# Patient Record
Sex: FEMALE | Race: WHITE | NOT HISPANIC OR LATINO | Employment: FULL TIME | URBAN - METROPOLITAN AREA
[De-identification: names, ages, dates, MRNs, and addresses within clinical notes are randomized per-mention and may not be internally consistent; named-entity substitution may affect disease eponyms.]

---

## 2017-10-10 ENCOUNTER — LAB (OUTPATIENT)
Dept: LAB | Facility: HOSPITAL | Age: 37
End: 2017-10-10

## 2017-10-10 ENCOUNTER — TRANSCRIBE ORDERS (OUTPATIENT)
Dept: LAB | Facility: HOSPITAL | Age: 37
End: 2017-10-10

## 2017-10-10 DIAGNOSIS — Z77.21 PERSONAL HISTORY OF EXPOSURE TO POTENTIALLY HAZARDOUS BODY FLUIDS: ICD-10-CM

## 2017-10-10 DIAGNOSIS — Z77.21 PERSONAL HISTORY OF EXPOSURE TO POTENTIALLY HAZARDOUS BODY FLUIDS: Primary | ICD-10-CM

## 2017-10-10 LAB
ALBUMIN SERPL-MCNC: 4.2 G/DL (ref 3.2–4.8)
ALBUMIN/GLOB SERPL: 1.7 G/DL (ref 1.5–2.5)
ALP SERPL-CCNC: 46 U/L (ref 25–100)
ALT SERPL W P-5'-P-CCNC: 16 U/L (ref 7–40)
ANION GAP SERPL CALCULATED.3IONS-SCNC: 5 MMOL/L (ref 3–11)
AST SERPL-CCNC: 15 U/L (ref 0–33)
BASOPHILS # BLD AUTO: 0.02 10*3/MM3 (ref 0–0.2)
BASOPHILS NFR BLD AUTO: 0.3 % (ref 0–1)
BILIRUB SERPL-MCNC: 0.3 MG/DL (ref 0.3–1.2)
BUN BLD-MCNC: 13 MG/DL (ref 9–23)
BUN/CREAT SERPL: 16.3 (ref 7–25)
CALCIUM SPEC-SCNC: 9.1 MG/DL (ref 8.7–10.4)
CHLORIDE SERPL-SCNC: 108 MMOL/L (ref 99–109)
CO2 SERPL-SCNC: 25 MMOL/L (ref 20–31)
CREAT BLD-MCNC: 0.8 MG/DL (ref 0.6–1.3)
DEPRECATED RDW RBC AUTO: 41.5 FL (ref 37–54)
EOSINOPHIL # BLD AUTO: 0.06 10*3/MM3 (ref 0–0.3)
EOSINOPHIL NFR BLD AUTO: 0.9 % (ref 0–3)
ERYTHROCYTE [DISTWIDTH] IN BLOOD BY AUTOMATED COUNT: 11.8 % (ref 11.3–14.5)
GFR SERPL CREATININE-BSD FRML MDRD: 81 ML/MIN/1.73
GLOBULIN UR ELPH-MCNC: 2.5 GM/DL
GLUCOSE BLD-MCNC: 97 MG/DL (ref 70–100)
HCT VFR BLD AUTO: 40 % (ref 34.5–44)
HGB BLD-MCNC: 13.5 G/DL (ref 11.5–15.5)
IMM GRANULOCYTES # BLD: 0.01 10*3/MM3 (ref 0–0.03)
IMM GRANULOCYTES NFR BLD: 0.1 % (ref 0–0.6)
LYMPHOCYTES # BLD AUTO: 1.11 10*3/MM3 (ref 0.6–4.8)
LYMPHOCYTES NFR BLD AUTO: 15.8 % (ref 24–44)
MCH RBC QN AUTO: 32.3 PG (ref 27–31)
MCHC RBC AUTO-ENTMCNC: 33.8 G/DL (ref 32–36)
MCV RBC AUTO: 95.7 FL (ref 80–99)
MONOCYTES # BLD AUTO: 0.34 10*3/MM3 (ref 0–1)
MONOCYTES NFR BLD AUTO: 4.8 % (ref 0–12)
NEUTROPHILS # BLD AUTO: 5.49 10*3/MM3 (ref 1.5–8.3)
NEUTROPHILS NFR BLD AUTO: 78.1 % (ref 41–71)
PLATELET # BLD AUTO: 180 10*3/MM3 (ref 150–450)
PMV BLD AUTO: 11.3 FL (ref 6–12)
POTASSIUM BLD-SCNC: 4.2 MMOL/L (ref 3.5–5.5)
PROT SERPL-MCNC: 6.7 G/DL (ref 5.7–8.2)
RBC # BLD AUTO: 4.18 10*6/MM3 (ref 3.89–5.14)
SODIUM BLD-SCNC: 138 MMOL/L (ref 132–146)
WBC NRBC COR # BLD: 7.03 10*3/MM3 (ref 3.5–10.8)

## 2017-10-10 PROCEDURE — 80053 COMPREHEN METABOLIC PANEL: CPT | Performed by: INTERNAL MEDICINE

## 2017-10-10 PROCEDURE — 85025 COMPLETE CBC W/AUTO DIFF WBC: CPT | Performed by: INTERNAL MEDICINE

## 2017-10-10 PROCEDURE — 36415 COLL VENOUS BLD VENIPUNCTURE: CPT

## 2024-10-29 ENCOUNTER — OFFICE VISIT (OUTPATIENT)
Age: 44
End: 2024-10-29
Payer: COMMERCIAL

## 2024-10-29 ENCOUNTER — LAB (OUTPATIENT)
Age: 44
End: 2024-10-29
Payer: COMMERCIAL

## 2024-10-29 VITALS
DIASTOLIC BLOOD PRESSURE: 68 MMHG | HEART RATE: 84 BPM | OXYGEN SATURATION: 98 % | BODY MASS INDEX: 20.79 KG/M2 | HEIGHT: 66 IN | SYSTOLIC BLOOD PRESSURE: 112 MMHG | RESPIRATION RATE: 18 BRPM | WEIGHT: 129.4 LBS

## 2024-10-29 DIAGNOSIS — R10.84 GENERALIZED ABDOMINAL PAIN: ICD-10-CM

## 2024-10-29 DIAGNOSIS — R13.19 ESOPHAGEAL DYSPHAGIA: ICD-10-CM

## 2024-10-29 DIAGNOSIS — G47.00 INSOMNIA, UNSPECIFIED TYPE: ICD-10-CM

## 2024-10-29 DIAGNOSIS — Z79.899 CONTROLLED SUBSTANCE AGREEMENT SIGNED: Primary | ICD-10-CM

## 2024-10-29 DIAGNOSIS — R10.13 DYSPEPSIA: ICD-10-CM

## 2024-10-29 LAB
ALBUMIN SERPL-MCNC: 4.3 G/DL (ref 3.5–5.2)
ALBUMIN/GLOB SERPL: 1.5 G/DL
ALP SERPL-CCNC: 88 U/L (ref 39–117)
ALT SERPL W P-5'-P-CCNC: 23 U/L (ref 1–33)
ANION GAP SERPL CALCULATED.3IONS-SCNC: 8 MMOL/L (ref 5–15)
AST SERPL-CCNC: 20 U/L (ref 1–32)
BASOPHILS # BLD AUTO: 0.03 10*3/MM3 (ref 0–0.2)
BASOPHILS NFR BLD AUTO: 0.4 % (ref 0–1.5)
BILIRUB SERPL-MCNC: 0.3 MG/DL (ref 0–1.2)
BUN SERPL-MCNC: 12 MG/DL (ref 6–20)
BUN/CREAT SERPL: 15.6 (ref 7–25)
CALCIUM SPEC-SCNC: 9.5 MG/DL (ref 8.6–10.5)
CHLORIDE SERPL-SCNC: 103 MMOL/L (ref 98–107)
CO2 SERPL-SCNC: 26 MMOL/L (ref 22–29)
CREAT SERPL-MCNC: 0.77 MG/DL (ref 0.57–1)
CRP SERPL-MCNC: <0.3 MG/DL (ref 0–0.5)
DEPRECATED RDW RBC AUTO: 40.4 FL (ref 37–54)
EGFRCR SERPLBLD CKD-EPI 2021: 98.3 ML/MIN/1.73
EOSINOPHIL # BLD AUTO: 0.07 10*3/MM3 (ref 0–0.4)
EOSINOPHIL NFR BLD AUTO: 0.8 % (ref 0.3–6.2)
ERYTHROCYTE [DISTWIDTH] IN BLOOD BY AUTOMATED COUNT: 11.2 % (ref 12.3–15.4)
GLOBULIN UR ELPH-MCNC: 2.9 GM/DL
GLUCOSE SERPL-MCNC: 87 MG/DL (ref 65–99)
HCT VFR BLD AUTO: 42.6 % (ref 34–46.6)
HGB BLD-MCNC: 14.2 G/DL (ref 12–15.9)
IMM GRANULOCYTES # BLD AUTO: 0.03 10*3/MM3 (ref 0–0.05)
IMM GRANULOCYTES NFR BLD AUTO: 0.4 % (ref 0–0.5)
LYMPHOCYTES # BLD AUTO: 1.33 10*3/MM3 (ref 0.7–3.1)
LYMPHOCYTES NFR BLD AUTO: 15.5 % (ref 19.6–45.3)
MCH RBC QN AUTO: 32.7 PG (ref 26.6–33)
MCHC RBC AUTO-ENTMCNC: 33.3 G/DL (ref 31.5–35.7)
MCV RBC AUTO: 98.2 FL (ref 79–97)
MONOCYTES # BLD AUTO: 0.95 10*3/MM3 (ref 0.1–0.9)
MONOCYTES NFR BLD AUTO: 11.1 % (ref 5–12)
NEUTROPHILS NFR BLD AUTO: 6.16 10*3/MM3 (ref 1.7–7)
NEUTROPHILS NFR BLD AUTO: 71.8 % (ref 42.7–76)
NRBC BLD AUTO-RTO: 0 /100 WBC (ref 0–0.2)
PLATELET # BLD AUTO: 255 10*3/MM3 (ref 140–450)
PMV BLD AUTO: 11.1 FL (ref 6–12)
POTASSIUM SERPL-SCNC: 4.2 MMOL/L (ref 3.5–5.2)
PROT SERPL-MCNC: 7.2 G/DL (ref 6–8.5)
RBC # BLD AUTO: 4.34 10*6/MM3 (ref 3.77–5.28)
SODIUM SERPL-SCNC: 137 MMOL/L (ref 136–145)
TSH SERPL DL<=0.05 MIU/L-ACNC: 1.67 UIU/ML (ref 0.27–4.2)
VIT B12 BLD-MCNC: 415 PG/ML (ref 211–946)
WBC NRBC COR # BLD AUTO: 8.57 10*3/MM3 (ref 3.4–10.8)

## 2024-10-29 PROCEDURE — 1159F MED LIST DOCD IN RCRD: CPT | Performed by: STUDENT IN AN ORGANIZED HEALTH CARE EDUCATION/TRAINING PROGRAM

## 2024-10-29 PROCEDURE — 1160F RVW MEDS BY RX/DR IN RCRD: CPT | Performed by: STUDENT IN AN ORGANIZED HEALTH CARE EDUCATION/TRAINING PROGRAM

## 2024-10-29 PROCEDURE — 84443 ASSAY THYROID STIM HORMONE: CPT | Performed by: STUDENT IN AN ORGANIZED HEALTH CARE EDUCATION/TRAINING PROGRAM

## 2024-10-29 PROCEDURE — 99204 OFFICE O/P NEW MOD 45 MIN: CPT | Performed by: STUDENT IN AN ORGANIZED HEALTH CARE EDUCATION/TRAINING PROGRAM

## 2024-10-29 PROCEDURE — 86140 C-REACTIVE PROTEIN: CPT | Performed by: STUDENT IN AN ORGANIZED HEALTH CARE EDUCATION/TRAINING PROGRAM

## 2024-10-29 PROCEDURE — 82607 VITAMIN B-12: CPT | Performed by: STUDENT IN AN ORGANIZED HEALTH CARE EDUCATION/TRAINING PROGRAM

## 2024-10-29 PROCEDURE — 85025 COMPLETE CBC W/AUTO DIFF WBC: CPT | Performed by: STUDENT IN AN ORGANIZED HEALTH CARE EDUCATION/TRAINING PROGRAM

## 2024-10-29 PROCEDURE — 36415 COLL VENOUS BLD VENIPUNCTURE: CPT | Performed by: STUDENT IN AN ORGANIZED HEALTH CARE EDUCATION/TRAINING PROGRAM

## 2024-10-29 PROCEDURE — 80053 COMPREHEN METABOLIC PANEL: CPT | Performed by: STUDENT IN AN ORGANIZED HEALTH CARE EDUCATION/TRAINING PROGRAM

## 2024-10-29 RX ORDER — LEVOCETIRIZINE DIHYDROCHLORIDE 5 MG/1
1 TABLET, FILM COATED ORAL DAILY
COMMUNITY

## 2024-10-29 RX ORDER — CLONAZEPAM 1 MG/1
1 TABLET ORAL NIGHTLY PRN
COMMUNITY
End: 2024-10-29 | Stop reason: SDUPTHER

## 2024-10-29 RX ORDER — CLONAZEPAM 1 MG/1
1 TABLET ORAL NIGHTLY PRN
Qty: 30 TABLET | Refills: 0 | Status: SHIPPED | OUTPATIENT
Start: 2024-10-29

## 2024-10-29 NOTE — PROGRESS NOTES
Office Note     Name: Marielos Moore    : 1980     MRN: [id number]     Chief Complaint  Establish Care (Pt is here to est care. She is also having a lot of bloating with anything she eats. Pt also states she has been working out and is curious if maybe its just from that that. )    Subjective     History of Present Illness:  Marielos Moore is a 44 y.o. female who presents today for initial visit to establish care.  She has anxiety/insomnia for which she is chronically on Klonopin.  Also reports new complaint of dyspepsia and esophageal dysphagia as well as generalized abdominal pain over the last year.  She notes bloating and discomfort after eating foods and notes that some solid foods get stuck as well as pills in her throat.  She does have GERD and has taken antacids intermittently.  Reports a history of ulcers but denies having any EGD done    Past Medical History:   Past Medical History:   Diagnosis Date    Anxiety     GERD (gastroesophageal reflux disease)        Past Surgical History:   Past Surgical History:   Procedure Laterality Date    BREAST SURGERY      CHOLECYSTECTOMY         Immunizations:   Immunization History   Administered Date(s) Administered    COVID-19 (MODERNA) 1st,2nd,3rd Dose Monovalent 2021, 2021        Medications:     Current Outpatient Medications:     clonazePAM (KlonoPIN) 1 MG tablet, Take 1 tablet by mouth At Night As Needed for Anxiety., Disp: 30 tablet, Rfl: 0    levocetirizine (Xyzal Allergy 24HR) 5 MG tablet, Take 1 tablet by mouth Daily., Disp: , Rfl:     Allergies:   Allergies   Allergen Reactions    Sertraline Unknown - High Severity       Family History:   Family History   Problem Relation Age of Onset    Alcohol abuse Mother     Drug abuse Mother     Alcohol abuse Father        Social History:   Social History     Socioeconomic History    Marital status: Single   Tobacco Use    Smoking status: Never    Smokeless tobacco: Never   Vaping Use    Vaping status:  "Never Used   Substance and Sexual Activity    Alcohol use: Yes     Comment: occ    Drug use: Never    Sexual activity: Defer         Objective     Vital Signs  /68 (BP Location: Left arm, Patient Position: Sitting, Cuff Size: Adult)   Pulse 84   Resp 18   Ht 166.5 cm (65.55\")   Wt 58.7 kg (129 lb 6.4 oz)   SpO2 98%   BMI 21.17 kg/m²   Estimated body mass index is 21.17 kg/m² as calculated from the following:    Height as of this encounter: 166.5 cm (65.55\").    Weight as of this encounter: 58.7 kg (129 lb 6.4 oz).    BMI is within normal parameters. No other follow-up for BMI required.      Physical Exam  Constitutional:       General: She is not in acute distress.     Appearance: She is not toxic-appearing.   Cardiovascular:      Rate and Rhythm: Normal rate and regular rhythm.      Heart sounds: No murmur heard.     No friction rub. No gallop.   Pulmonary:      Effort: Pulmonary effort is normal.      Breath sounds: Normal breath sounds.   Abdominal:      General: Abdomen is flat. There is no distension.   Skin:     General: Skin is warm and dry.   Neurological:      Mental Status: She is alert.   Psychiatric:         Mood and Affect: Mood normal.         Behavior: Behavior normal.          Assessment and Plan     1. Controlled substance agreement signed  UDS and CSA completed for Klonopin today  - ToxAssure Flex 23, Ur -; Future  - ToxAssure Flex 23, Ur -    2. Dyspepsia  Concern for H. pylori/PUD, will refer for EGD and check labs today  - Ambulatory referral for Screening EGD  - Comprehensive Metabolic Panel; Future  - CBC Auto Differential; Future    3. Esophageal dysphagia  Patient with symptoms of esophageal dysphagia, history of GERD, will refer for EGD as above and hope to have both dyspepsia and dysphagia addressed  - Ambulatory referral for Screening EGD  - TSH Rfx On Abnormal To Free T4; Future    4. Generalized abdominal pain  Chronic, stable no signs of red flag other than " dyspepsia  -Check  - Comprehensive Metabolic Panel; Future  - CBC Auto Differential; Future  - Vitamin B12; Future  - C-reactive Protein; Future    5. Insomnia, unspecified type  Chronic, stable  -Takes Klonopin as needed and reports that she takes this less than half the days.  As long as she continues to take the medicine on average less than half the days in a month, will continue to prescribe this, advised that she should not take this medicine every day as it will lose efficacy and lead to dependence  -Patient is agreeable to that and we will fill as long as she is able to make 30 pills last at least 2 months on average  - clonazePAM (KlonoPIN) 1 MG tablet; Take 1 tablet by mouth At Night As Needed for Anxiety.  Dispense: 30 tablet; Refill: 0       Counseling was given to patient for the following topics: instructions for management.    Follow Up  Return after EGD.    David Brooks MD  [facility]  [address] [place] [id number] [phone]

## 2024-11-02 LAB
1OH-MIDAZOLAM UR QL SCN: NOT DETECTED NG/MG CREAT
6MAM UR QL SCN: NEGATIVE NG/ML
7AMINOCLONAZEPAM/CREAT UR: 26 NG/MG CREAT
A-OH ALPRAZ/CREAT UR: NOT DETECTED NG/MG CREAT
A-OH-TRIAZOLAM/CREAT UR CFM: NOT DETECTED NG/MG CREAT
ACP UR QL CFM: NOT DETECTED
ALPRAZ/CREAT UR CFM: NOT DETECTED NG/MG CREAT
AMPHETAMINES UR QL SCN: NEGATIVE NG/ML
APAP UR QL SCN: NEGATIVE UG/ML
BARBITURATES UR QL SCN: NEGATIVE NG/ML
BENZODIAZ SCN METH UR: NORMAL
BUPRENORPHINE UR QL SCN: NEGATIVE
BUPRENORPHINE/CREAT UR: NOT DETECTED NG/MG CREAT
CANNABINOIDS UR QL SCN: NEGATIVE NG/ML
CARISOPRODOL UR QL: NEGATIVE NG/ML
CLONAZEPAM/CREAT UR CFM: NOT DETECTED NG/MG CREAT
COCAINE+BZE UR QL SCN: NEGATIVE NG/ML
CREAT UR-MCNC: 195 MG/DL
D-METHORPHAN UR-MCNC: NOT DETECTED NG/ML
D-METHORPHAN+LEVORPHANOL UR QL: NOT DETECTED
DESALKYLFLURAZ/CREAT UR: NOT DETECTED NG/MG CREAT
DIAZEPAM/CREAT UR: NOT DETECTED NG/MG CREAT
ETHANOL UR QL SCN: NEGATIVE G/DL
ETHANOL UR QL SCN: NEGATIVE NG/ML
FENTANYL CTO UR SCN-MCNC: NEGATIVE NG/ML
FENTANYL/CREAT UR: NOT DETECTED NG/MG CREAT
FLUNITRAZEPAM UR QL SCN: NOT DETECTED NG/MG CREAT
GABAPENTIN UR-MCNC: NEGATIVE UG/ML
HALLUCINOGENS UR: NEGATIVE
HYPNOTICS UR QL SCN: NEGATIVE
KETAMINE UR QL: NOT DETECTED
LORAZEPAM/CREAT UR: NOT DETECTED NG/MG CREAT
MEPERIDINE UR QL SCN: NEGATIVE NG/ML
METHADONE UR QL SCN: NEGATIVE NG/ML
METHADONE+METAB UR QL SCN: NEGATIVE NG/ML
MIDAZOLAM/CREAT UR CFM: NOT DETECTED NG/MG CREAT
MISCELLANEOUS, UR: NEGATIVE
NORBUPRENORPHINE/CREAT UR: NOT DETECTED NG/MG CREAT
NORDIAZEPAM/CREAT UR: NOT DETECTED NG/MG CREAT
NORFENTANYL/CREAT UR: NOT DETECTED NG/MG CREAT
NORFLUNITRAZEPAM UR-MCNC: NOT DETECTED NG/MG CREAT
NORKETAMINE UR-MCNC: NOT DETECTED UG/ML
OPIATES UR SCN-MCNC: NEGATIVE NG/ML
OXAZEPAM/CREAT UR: NOT DETECTED NG/MG CREAT
OXYCODONE CTO UR SCN-MCNC: NEGATIVE NG/ML
PCP UR QL SCN: NEGATIVE NG/ML
PRESCRIBED MEDICATIONS: NORMAL
PROPOXYPH UR QL SCN: NEGATIVE NG/ML
TAPENTADOL CTO UR SCN-MCNC: NEGATIVE NG/ML
TEMAZEPAM/CREAT UR: NOT DETECTED NG/MG CREAT
TRAMADOL UR QL SCN: NEGATIVE NG/ML
ZALEPLON UR-MCNC: NOT DETECTED NG/ML
ZOLPIDEM PHENYL-4-CARB UR QL SCN: NOT DETECTED
ZOLPIDEM UR QL SCN: NOT DETECTED
ZOPICLONE-N-OXIDE UR-MCNC: NOT DETECTED NG/ML

## 2024-11-07 ENCOUNTER — OUTSIDE FACILITY SERVICE (OUTPATIENT)
Dept: GASTROENTEROLOGY | Facility: CLINIC | Age: 44
End: 2024-11-07
Payer: COMMERCIAL

## 2024-11-07 ENCOUNTER — TELEPHONE (OUTPATIENT)
Dept: GASTROENTEROLOGY | Facility: CLINIC | Age: 44
End: 2024-11-07

## 2024-11-07 DIAGNOSIS — K21.00 GASTROESOPHAGEAL REFLUX DISEASE WITH ESOPHAGITIS WITHOUT HEMORRHAGE: Primary | ICD-10-CM

## 2024-11-07 PROCEDURE — 88342 IMHCHEM/IMCYTCHM 1ST ANTB: CPT | Performed by: INTERNAL MEDICINE

## 2024-11-07 PROCEDURE — 88305 TISSUE EXAM BY PATHOLOGIST: CPT | Performed by: INTERNAL MEDICINE

## 2024-11-07 RX ORDER — ESOMEPRAZOLE MAGNESIUM 40 MG/1
40 CAPSULE, DELAYED RELEASE ORAL DAILY
Qty: 30 CAPSULE | Refills: 5 | Status: SHIPPED | OUTPATIENT
Start: 2024-11-07

## 2024-11-07 NOTE — TELEPHONE ENCOUNTER
Patient left voicemail.    [facility] is stating they have not received RX for PPI from todays visit.    Please advise?

## 2024-11-08 ENCOUNTER — LAB REQUISITION (OUTPATIENT)
Dept: LAB | Facility: HOSPITAL | Age: 44
End: 2024-11-08
Payer: COMMERCIAL

## 2024-11-08 DIAGNOSIS — R14.0 ABDOMINAL DISTENSION (GASEOUS): ICD-10-CM

## 2024-11-08 DIAGNOSIS — K29.70 GASTRITIS, UNSPECIFIED, WITHOUT BLEEDING: ICD-10-CM

## 2024-11-08 DIAGNOSIS — R13.10 DYSPHAGIA, UNSPECIFIED: ICD-10-CM

## 2024-11-08 DIAGNOSIS — K21.00 GASTRO-ESOPHAGEAL REFLUX DISEASE WITH ESOPHAGITIS, WITHOUT BLEEDING: ICD-10-CM

## 2024-11-12 ENCOUNTER — PATIENT MESSAGE (OUTPATIENT)
Age: 44
End: 2024-11-12
Payer: COMMERCIAL

## 2024-11-12 DIAGNOSIS — K29.70 HELICOBACTER PYLORI GASTRITIS: Primary | ICD-10-CM

## 2024-11-12 DIAGNOSIS — B96.81 HELICOBACTER PYLORI GASTRITIS: Primary | ICD-10-CM

## 2024-11-13 LAB — REF LAB TEST METHOD: NORMAL

## 2024-11-14 RX ORDER — BISMUTH SUBSALICYLATE 262 MG/1
524 TABLET, CHEWABLE ORAL
Qty: 84 TABLET | Refills: 0 | Status: SHIPPED | OUTPATIENT
Start: 2024-11-14 | End: 2024-11-28

## 2024-11-14 RX ORDER — METRONIDAZOLE 500 MG/1
500 TABLET ORAL 3 TIMES DAILY
Qty: 42 TABLET | Refills: 0 | Status: SHIPPED | OUTPATIENT
Start: 2024-11-14 | End: 2024-11-28

## 2024-11-14 RX ORDER — TETRACYCLINE HYDROCHLORIDE 500 MG/1
500 CAPSULE ORAL 4 TIMES DAILY
Qty: 56 CAPSULE | Refills: 0 | Status: SHIPPED | OUTPATIENT
Start: 2024-11-14 | End: 2024-11-28

## 2024-11-18 ENCOUNTER — TELEPHONE (OUTPATIENT)
Dept: GASTROENTEROLOGY | Facility: CLINIC | Age: 44
End: 2024-11-18
Payer: COMMERCIAL

## 2024-11-18 ENCOUNTER — PATIENT MESSAGE (OUTPATIENT)
Age: 44
End: 2024-11-18
Payer: COMMERCIAL

## 2024-11-18 DIAGNOSIS — K21.00 GASTROESOPHAGEAL REFLUX DISEASE WITH ESOPHAGITIS WITHOUT HEMORRHAGE: ICD-10-CM

## 2024-11-18 RX ORDER — ESOMEPRAZOLE MAGNESIUM 40 MG/1
40 CAPSULE, DELAYED RELEASE ORAL 2 TIMES DAILY
Qty: 90 CAPSULE | Refills: 0 | Status: SHIPPED | OUTPATIENT
Start: 2024-11-18

## 2024-11-18 NOTE — TELEPHONE ENCOUNTER
----- Message from Sincere Preciado sent at 11/14/2024  3:11 PM EST -----  Let Ms. Moore know there was evidence of H. pylori on the gastric biopsies.  The primary care physician had E scribed antibiotics for her to take.  She should have a follow-up in 2 months with to have a breath test to check for eradication.

## 2024-11-18 NOTE — TELEPHONE ENCOUNTER
I SPOKE WITH MS TELLO. BIOPSY RESULTS GIVEN. PATIENT STATED THAT SHE HAS FOLLOW UP SCHEDULED WITH PCP.

## 2024-11-20 ENCOUNTER — TELEPHONE (OUTPATIENT)
Age: 44
End: 2024-11-20
Payer: COMMERCIAL

## 2024-11-20 ENCOUNTER — PRIOR AUTHORIZATION (OUTPATIENT)
Age: 44
End: 2024-11-20
Payer: COMMERCIAL

## 2024-11-20 RX ORDER — BISMUTH SUBCITRATE POTASSIUM, METRONIDAZOLE AND TETRACYCLINE HYDROCHLORIDE 140; 125; 125 MG/1; MG/1; MG/1
3 CAPSULE ORAL
Qty: 168 CAPSULE | Refills: 0 | Status: SHIPPED | OUTPATIENT
Start: 2024-11-20 | End: 2024-12-04

## 2024-11-20 NOTE — TELEPHONE ENCOUNTER
Pharmacy Name:      [facility]    Pharmacy representative name:     LATONYA    Pharmacy representative phone number:     [phone] (Work)     What medication are you calling in regards to:       metroNIDAZOLE 500 MG/5ML suspension       doxycycline (VIBRAMYCIN) 50 MG/5ML syrup     What question does the pharmacy have:     PHARMACY STATED THE METRONIDAZOLE SUSPENSION REQUIRES A PRIOR AUTHORIZATION IN ORDER TO FILL    PHARMACY ALSO STATED THE DOXYCYCLINE IS NO LONGER ON THE MARKET    PHARMACY STATED IF DR BASHIR WOULD LIKE FOR PATIENT TO RECEIVE A MEDICATION LIKE THE DOXYCYCLINE, IT WOULD BE RECOMMENDED TO SEND PRESCRIPTION TO A COMPOUNDING PHARMACY    PHARMACY ALSO REQUESTED A NEW PRESCRIPTION IF DR BASHIR WOULD LIKE TO PRESCRIBE A COMPARABLE MEDICATION TO THE DOXYCYCLINE INSTEAD    Who is the provider that prescribed the medication:     DR BASHIR

## 2024-11-20 NOTE — TELEPHONE ENCOUNTER
Drug: Metronidzole has been denied by plan.    -Our guideline named ANTIBIOTICS - GASTROINTESTINAL requires the following rule(s) be met for approval: The member had a trial and failure [drug did not work] of an appropriate course of therapy, allergy, contraindication [harmful for] (including potential drug-drug interactions with other medications) or intolerance [side effect] of or evidence of organism resistance to 2 preferred agents: Firvanq, metronidazole tablets, neomycin, tinidazole, vancomycin capsules, Xifaxan.

## 2024-11-20 NOTE — TELEPHONE ENCOUNTER
Faxed last office notes and EGD report to [facility] Appeals @ [phone] -Once we receive your appeal request, we have 30 days to make a decision. You may request an expedited (fast) appeal if you or your provider believe that there will be serious   harm to your health by waiting for an appeal decision. We can deny your request for an expedited appeal and process your appeal as a non-expedited appeal. An expedited appeal   requires us to provide you a decision within 72 hours.   PH: [phone]

## 2024-11-20 NOTE — TELEPHONE ENCOUNTER
Pharmacy Name: [facility] [id number] - [address] [facility] [phone] [facility] [phone]          Pharmacy representative name:  MIREYA    Pharmacy representative phone number: [phone]    What medication are you calling in regards to: EMORY    What question does the pharmacy have: THE ONLY WAY THIS MEDICATION COMES IS IN A TEN DAY BOX. WOULD DR BASHIR BE OK FOR THE PATIENT TO TRY FOR TEN DAYS AND THEN ORDER MORE IF NEEDED LATER? THEY DON'T WANT TO OPEN A WHOLE OTHER BOX IN ORDER TO GIVE THE PATIENT 14 DAYS    Who is the provider that prescribed the medication: DR JOSE BASHIR    Additional notes:

## 2024-11-21 ENCOUNTER — PRIOR AUTHORIZATION (OUTPATIENT)
Age: 44
End: 2024-11-21
Payer: COMMERCIAL

## 2024-11-21 NOTE — TELEPHONE ENCOUNTER
Marielos Moore (Boogie: [id number]) - [id number]  Esomeprazole Magnesium 40MG  capsules  status: PA RequestCreated: November 21st, 2024 [phone]: November 21st, 2024

## 2024-11-22 ENCOUNTER — DOCUMENTATION (OUTPATIENT)
Age: 44
End: 2024-11-22
Payer: COMMERCIAL

## 2024-11-22 NOTE — TELEPHONE ENCOUNTER
Marielos Moore (Boogie: [id number]) - [id number]  Esomeprazole Magnesium 40MG dr capsules  status: PA Response - ApprovedCreated: November 21st, 2024 [phone]: November 21st, 2024

## 2024-11-25 ENCOUNTER — TELEPHONE (OUTPATIENT)
Age: 44
End: 2024-11-25
Payer: COMMERCIAL

## 2024-11-25 NOTE — TELEPHONE ENCOUNTER
Caller: Galina Mooremary Oliveraee    Relationship: Self    Best call back number: [phone] (home)     What is the best time to reach you: AS SOON AS    Who are you requesting to speak with (clinical staff, provider,  specific staff member): PROVIDER    What was the call regarding: PATIENT BELIEVED SHE WAS HAVING AN ALLERGIC REACTION TO THE PYLERA AND WENT TO [facility]. THEY SENT HER TO  BUT THE WAIT WAS GOING TO BE AT LEAST 2 HOURS AND SHE WAS SURROUNDED BY PEOPLE SICK WITH COVID AND DID NOT WANT TO CATCH THAT SO SHE LEFT. SHE WANTS TO KNOW SHOULD HE CONSULT AN EYE DOCTOR? SHE STOPPED TAKING THE MEDICATION YESTERDAY, BUT SHE STILL HAS BLURRY VISION

## 2024-11-25 NOTE — TELEPHONE ENCOUNTER
OK to relay     LMTCB    Pylera does not commonly cause that side effects of the blurry vision. If patient has lost vision needs to get evaluated for stroke, or if blurry needs to come here for evaluation or other causes.

## 2024-11-25 NOTE — TELEPHONE ENCOUNTER
Pylera does not commonly cause that side effects of the blurry vision. If patient has lost vision needs to get evaluated for stroke, or if blurry needs to come here for evaluation or other causes.    Edwin Perdomo MD

## 2024-11-26 ENCOUNTER — OFFICE VISIT (OUTPATIENT)
Age: 44
End: 2024-11-26
Payer: COMMERCIAL

## 2024-11-26 VITALS
OXYGEN SATURATION: 98 % | SYSTOLIC BLOOD PRESSURE: 106 MMHG | BODY MASS INDEX: 20.73 KG/M2 | WEIGHT: 129 LBS | HEIGHT: 66 IN | HEART RATE: 78 BPM | DIASTOLIC BLOOD PRESSURE: 76 MMHG

## 2024-11-26 DIAGNOSIS — K29.70 HELICOBACTER PYLORI GASTRITIS: Primary | ICD-10-CM

## 2024-11-26 DIAGNOSIS — R97.0 ELEVATED CEA: ICD-10-CM

## 2024-11-26 DIAGNOSIS — B96.81 HELICOBACTER PYLORI GASTRITIS: Primary | ICD-10-CM

## 2024-11-26 PROCEDURE — 99214 OFFICE O/P EST MOD 30 MIN: CPT | Performed by: STUDENT IN AN ORGANIZED HEALTH CARE EDUCATION/TRAINING PROGRAM

## 2024-11-26 RX ORDER — CLARITHROMYCIN 500 MG/1
500 TABLET ORAL 2 TIMES DAILY
Qty: 28 TABLET | Refills: 0 | Status: SHIPPED | OUTPATIENT
Start: 2024-11-26 | End: 2024-12-10

## 2024-11-26 RX ORDER — AMOXICILLIN 500 MG/1
1000 TABLET, FILM COATED ORAL 2 TIMES DAILY
Qty: 56 TABLET | Refills: 0 | Status: SHIPPED | OUTPATIENT
Start: 2024-11-26 | End: 2024-12-10

## 2024-11-26 NOTE — PROGRESS NOTES
Office Note     Name: Marielos Moore    : 1980     MRN: [id number]     Chief Complaint  Abdominal Pain (H. Pylori )    Subjective     History of Present Illness:    History of Present Illness  The patient is a 44-year-old female presenting for H. pylori treatment. She experienced blurry vision from Pylera, attributed to tetracycline, lasting from Thursday night to .     She has stomach pain and seeks antibiotic treatment. She has metronidazole and tetracycline at home but is concerned about pill size. She takes esomeprazole twice daily and is curious if H. pylori causes her bloating. She seeks dietary advice and takes probiotics.     She recently had an ablation procedure for an enlarged uterine lining. Elevated CEA levels were attributed to inflammation. She was referred to a colorectal specialist and requested a CT scan with contrast but has not heard back in 2 weeks.    SOCIAL HISTORY  Non-smoker.        Past Medical History:   Past Medical History:   Diagnosis Date    Anxiety     GERD (gastroesophageal reflux disease)        Past Surgical History:   Past Surgical History:   Procedure Laterality Date    BREAST SURGERY      CHOLECYSTECTOMY         Immunizations:   Immunization History   Administered Date(s) Administered    COVID-19 (MODERNA) 1st,2nd,3rd Dose Monovalent 2021, 2021        Medications:     Current Outpatient Medications:     clonazePAM (KlonoPIN) 1 MG tablet, Take 1 tablet by mouth At Night As Needed for Anxiety., Disp: 30 tablet, Rfl: 0    esomeprazole (nexIUM) 40 MG capsule, Take 1 capsule by mouth 2 (Two) Times a Day., Disp: 90 capsule, Rfl: 0    levocetirizine (Xyzal Allergy 24HR) 5 MG tablet, Take 1 tablet by mouth Daily., Disp: , Rfl:     amoxicillin (AMOXIL) 500 MG tablet, Take 2 tablets by mouth 2 (Two) Times a Day for 14 days., Disp: 56 tablet, Rfl: 0    clarithromycin (BIAXIN) 500 MG tablet, Take 1 tablet by mouth 2 (Two) Times a Day for 14 days., Disp: 28  "tablet, Rfl: 0    Allergies:   Allergies   Allergen Reactions    Sertraline Unknown - High Severity       Family History:   Family History   Problem Relation Age of Onset    Alcohol abuse Mother     Drug abuse Mother     Alcohol abuse Father        Social History:   Social History     Socioeconomic History    Marital status: Single   Tobacco Use    Smoking status: Never    Smokeless tobacco: Never   Vaping Use    Vaping status: Never Used   Substance and Sexual Activity    Alcohol use: Yes     Comment: occ    Drug use: Never    Sexual activity: Defer         Objective     Vital Signs  /76   Pulse 78   Ht 166.5 cm (65.55\")   Wt 58.5 kg (129 lb)   SpO2 98%   BMI 21.11 kg/m²   Estimated body mass index is 21.11 kg/m² as calculated from the following:    Height as of this encounter: 166.5 cm (65.55\").    Weight as of this encounter: 58.5 kg (129 lb).    BMI is within normal parameters. No other follow-up for BMI required.      Physical Exam  Vitals reviewed.   Constitutional:       Appearance: Normal appearance.   HENT:      Head: Normocephalic and atraumatic.   Cardiovascular:      Rate and Rhythm: Normal rate.   Pulmonary:      Effort: Pulmonary effort is normal. No respiratory distress.   Neurological:      General: No focal deficit present.      Mental Status: She is alert and oriented to person, place, and time.   Psychiatric:         Mood and Affect: Mood normal.         Behavior: Behavior normal.          Assessment and Plan     Diagnoses and all orders for this visit:    1. Helicobacter pylori gastritis (Primary)  -     clarithromycin (BIAXIN) 500 MG tablet; Take 1 tablet by mouth 2 (Two) Times a Day for 14 days.  Dispense: 28 tablet; Refill: 0  -     amoxicillin (AMOXIL) 500 MG tablet; Take 2 tablets by mouth 2 (Two) Times a Day for 14 days.  Dispense: 56 tablet; Refill: 0         Assessment & Plan  H. pylori infection  - Discontinue doxycycline, Pylera, and Pepto-Bismol  - Continue esomeprazole " twice daily  - Prescribe clarithromycin and amoxicillin twice daily for 14 days  - Confirm eradication 4 weeks post-treatment, abstain from esomeprazole 2 weeks prior  - Suggest low acid diet, education provided on AVS       Elevated CEA  - CEA elevated on labs from outside provider, 5.7   - GYN referred to Colorectal surgery  -Recommend colorectal surgeon consultation  - Non-smoker      Follow Up  No follow-ups on file.    Patient or patient representative verbalized consent for the use of Ambient Listening during the visit with  Kanchan Wu MD for chart documentation. 11/26/2024  13:25 EST      Kanchan Wu MD   [facility]  [facility] [address] [address] [phone]    Please note that portions of this document may have been completed with a voice recognition program.      At [facility], we believe that sharing information builds trust and better relationships. You are receiving this note because you are receiving care at [facility] or have recently visited. It is possible you will see health information before a provider has talked with you about it. This kind of information can be easy to misunderstand as it is a medical document. It is intended as [id number] communication. It is written in medical language and may contain abbreviations or verbiage that are unfamiliar. It may appear blunt or direct. Medical documents are intended to carry relevant information, facts as evident, and the clinical opinion of the practitioner.  To help you fully understand what it means for your health, we urge you to discuss this note with your provider.

## 2024-12-20 ENCOUNTER — OFFICE VISIT (OUTPATIENT)
Age: 44
End: 2024-12-20
Payer: COMMERCIAL

## 2024-12-20 VITALS
WEIGHT: 127.4 LBS | HEART RATE: 70 BPM | BODY MASS INDEX: 20.48 KG/M2 | DIASTOLIC BLOOD PRESSURE: 72 MMHG | SYSTOLIC BLOOD PRESSURE: 112 MMHG | RESPIRATION RATE: 18 BRPM | OXYGEN SATURATION: 97 % | HEIGHT: 66 IN

## 2024-12-20 DIAGNOSIS — R97.0 ELEVATED CEA: ICD-10-CM

## 2024-12-20 DIAGNOSIS — G47.00 INSOMNIA, UNSPECIFIED TYPE: ICD-10-CM

## 2024-12-20 DIAGNOSIS — B96.81 HELICOBACTER PYLORI GASTRITIS: Primary | ICD-10-CM

## 2024-12-20 DIAGNOSIS — K29.70 HELICOBACTER PYLORI GASTRITIS: Primary | ICD-10-CM

## 2024-12-20 PROCEDURE — 1159F MED LIST DOCD IN RCRD: CPT | Performed by: STUDENT IN AN ORGANIZED HEALTH CARE EDUCATION/TRAINING PROGRAM

## 2024-12-20 PROCEDURE — 1160F RVW MEDS BY RX/DR IN RCRD: CPT | Performed by: STUDENT IN AN ORGANIZED HEALTH CARE EDUCATION/TRAINING PROGRAM

## 2024-12-20 PROCEDURE — 99214 OFFICE O/P EST MOD 30 MIN: CPT | Performed by: STUDENT IN AN ORGANIZED HEALTH CARE EDUCATION/TRAINING PROGRAM

## 2024-12-20 RX ORDER — CLONAZEPAM 1 MG/1
1 TABLET ORAL NIGHTLY PRN
Qty: 30 TABLET | Refills: 0 | Status: SHIPPED | OUTPATIENT
Start: 2024-12-20

## 2024-12-20 NOTE — PROGRESS NOTES
Office Note     Name: Marielos Moore    : 1980     MRN: [id number]     Chief Complaint  Follow-up (Pt is here for f/u for H Pylori, she is still having heartburn,indigestion and stomach cramps. )    Subjective     History of Present Illness:  Marielos Moore is a 44 y.o. female who presents today for Follow up of insomnia and H. Pylori gastritis as well as elevated CEA.  She had her colonoscopy a couple of days ago and was told she did not have any polyps. Biopsies pending for IBD.  Patient stopped all meds including the esomeprazole around 12/10. Completed her Clarithromycin triple therapy, had multiple issues with med tolerance.    Past Medical History:   Past Medical History:   Diagnosis Date    Anxiety     GERD (gastroesophageal reflux disease)        Past Surgical History:   Past Surgical History:   Procedure Laterality Date    BREAST SURGERY      CHOLECYSTECTOMY      COLONOSCOPY         Immunizations:   Immunization History   Administered Date(s) Administered    COVID-19 (MODERNA) 1st,2nd,3rd Dose Monovalent 2021, 2021        Medications:     Current Outpatient Medications:     clonazePAM (KlonoPIN) 1 MG tablet, Take 1 tablet by mouth At Night As Needed for Anxiety., Disp: 30 tablet, Rfl: 0    levocetirizine (Xyzal Allergy 24HR) 5 MG tablet, Take 1 tablet by mouth Daily. (Patient taking differently: Take 1 tablet by mouth Daily. PRN), Disp: , Rfl:     esomeprazole (nexIUM) 40 MG capsule, Take 1 capsule by mouth 2 (Two) Times a Day. (Patient not taking: Reported on 2024), Disp: 90 capsule, Rfl: 0    Allergies:   Allergies   Allergen Reactions    Sertraline Unknown - High Severity    Tetracycline Other (See Comments)     Blurry vision        Family History:   Family History   Problem Relation Age of Onset    Alcohol abuse Mother     Drug abuse Mother     Alcohol abuse Father        Social History:   Social History     Socioeconomic History    Marital status: Single   Tobacco  "Use    Smoking status: Never    Smokeless tobacco: Never   Vaping Use    Vaping status: Never Used   Substance and Sexual Activity    Alcohol use: Yes     Comment: occ    Drug use: Never    Sexual activity: Defer         Objective     Vital Signs  /72 (BP Location: Left arm, Patient Position: Sitting, Cuff Size: Adult)   Pulse 70   Resp 18   Ht 166.5 cm (65.55\")   Wt 57.8 kg (127 lb 6.4 oz)   SpO2 97%   BMI 20.85 kg/m²   Estimated body mass index is 20.85 kg/m² as calculated from the following:    Height as of this encounter: 166.5 cm (65.55\").    Weight as of this encounter: 57.8 kg (127 lb 6.4 oz).    BMI is within normal parameters. No other follow-up for BMI required.      Physical Exam  Constitutional:       General: She is not in acute distress.     Appearance: She is not toxic-appearing.   Cardiovascular:      Rate and Rhythm: Normal rate and regular rhythm.      Heart sounds: No murmur heard.     No friction rub. No gallop.   Pulmonary:      Effort: Pulmonary effort is normal.      Breath sounds: Normal breath sounds.   Abdominal:      General: Abdomen is flat. There is no distension.      Tenderness: There is abdominal tenderness.   Skin:     General: Skin is warm and dry.   Neurological:      Mental Status: She is alert.   Psychiatric:         Mood and Affect: Mood normal.         Behavior: Behavior normal.          Assessment and Plan     1. Insomnia, unspecified type  Chronic stable,  Refill clonazepam  -UDS and CSA up to date, PDMP reviewed and appropriate  -Patient aware of side effects and need to use this as a PRN medication not daily.   - clonazePAM (KlonoPIN) 1 MG tablet; Take 1 tablet by mouth At Night As Needed for Anxiety.  Dispense: 30 tablet; Refill: 0    2. Helicobacter pylori gastritis  Chronic, uncontrolled.  Still having symptoms of stomach pain, patient unfortunately had to do clarithromycin triple therapy due to intolerances.    -Counseled her that she can continue " esomeprazole daily until 1/1, then she should come in for a breath test (fasting) on or shortly after 1/15  - H. Pylori Breath Test - Breath, Lung; Future    3. Elevated CEA  Colonoscopy and EGD completed, no polyps per patient  Random biopsies pending  Likely due to gastritis       Counseling was given to patient for the following topics: instructions for management.    Follow Up  No follow-ups on file.    David Brooks MD  [facility]  [address] [address] [phone]

## 2025-01-05 DIAGNOSIS — K21.00 GASTROESOPHAGEAL REFLUX DISEASE WITH ESOPHAGITIS WITHOUT HEMORRHAGE: ICD-10-CM

## 2025-01-06 ENCOUNTER — HOSPITAL ENCOUNTER (EMERGENCY)
Facility: HOSPITAL | Age: 45
Discharge: HOME | End: 2025-01-06
Attending: EMERGENCY MEDICINE
Payer: COMMERCIAL

## 2025-01-06 VITALS
DIASTOLIC BLOOD PRESSURE: 85 MMHG | BODY MASS INDEX: 20.41 KG/M2 | HEIGHT: 66 IN | HEART RATE: 67 BPM | SYSTOLIC BLOOD PRESSURE: 124 MMHG | RESPIRATION RATE: 17 BRPM | WEIGHT: 127 LBS | OXYGEN SATURATION: 100 % | TEMPERATURE: 98.6 F

## 2025-01-06 DIAGNOSIS — G89.18 POST-OPERATIVE PAIN: Primary | ICD-10-CM

## 2025-01-06 LAB
ANION GAP SERPL CALCULATED.3IONS-SCNC: 12 MMOL/L (ref 5–15)
BACTERIA UR QL AUTO: ABNORMAL /HPF
BASOPHILS # BLD AUTO: 0.05 10*3/MM3 (ref 0–0.2)
BASOPHILS NFR BLD AUTO: 0.6 % (ref 0–1.5)
BILIRUB UR QL STRIP: NEGATIVE
BUN SERPL-MCNC: 14 MG/DL (ref 6–20)
BUN/CREAT SERPL: 25.9 (ref 7–25)
CALCIUM SPEC-SCNC: 9.3 MG/DL (ref 8.6–10.5)
CHLORIDE SERPL-SCNC: 104 MMOL/L (ref 98–107)
CLARITY UR: CLEAR
CO2 SERPL-SCNC: 23 MMOL/L (ref 22–29)
COLOR UR: YELLOW
CREAT SERPL-MCNC: 0.54 MG/DL (ref 0.57–1)
DEPRECATED RDW RBC AUTO: 42 FL (ref 37–54)
EGFRCR SERPLBLD CKD-EPI 2021: 117.3 ML/MIN/1.73
EOSINOPHIL # BLD AUTO: 0.16 10*3/MM3 (ref 0–0.4)
EOSINOPHIL NFR BLD AUTO: 1.8 % (ref 0.3–6.2)
ERYTHROCYTE [DISTWIDTH] IN BLOOD BY AUTOMATED COUNT: 11.9 % (ref 12.3–15.4)
GLUCOSE SERPL-MCNC: 100 MG/DL (ref 65–99)
GLUCOSE UR STRIP-MCNC: NEGATIVE MG/DL
HCT VFR BLD AUTO: 38.9 % (ref 34–46.6)
HGB BLD-MCNC: 13.2 G/DL (ref 12–15.9)
HGB UR QL STRIP.AUTO: ABNORMAL
HOLD SPECIMEN: NORMAL
HYALINE CASTS UR QL AUTO: ABNORMAL /LPF
IMM GRANULOCYTES # BLD AUTO: 0.02 10*3/MM3 (ref 0–0.05)
IMM GRANULOCYTES NFR BLD AUTO: 0.2 % (ref 0–0.5)
KETONES UR QL STRIP: NEGATIVE
LEUKOCYTE ESTERASE UR QL STRIP.AUTO: ABNORMAL
LYMPHOCYTES # BLD AUTO: 2.07 10*3/MM3 (ref 0.7–3.1)
LYMPHOCYTES NFR BLD AUTO: 23.2 % (ref 19.6–45.3)
MCH RBC QN AUTO: 32.5 PG (ref 26.6–33)
MCHC RBC AUTO-ENTMCNC: 33.9 G/DL (ref 31.5–35.7)
MCV RBC AUTO: 95.8 FL (ref 79–97)
MONOCYTES # BLD AUTO: 0.71 10*3/MM3 (ref 0.1–0.9)
MONOCYTES NFR BLD AUTO: 8 % (ref 5–12)
NEUTROPHILS NFR BLD AUTO: 5.91 10*3/MM3 (ref 1.7–7)
NEUTROPHILS NFR BLD AUTO: 66.2 % (ref 42.7–76)
NITRITE UR QL STRIP: NEGATIVE
NRBC BLD AUTO-RTO: 0 /100 WBC (ref 0–0.2)
PH UR STRIP.AUTO: 6 [PH] (ref 5–8)
PLATELET # BLD AUTO: 240 10*3/MM3 (ref 140–450)
PMV BLD AUTO: 11 FL (ref 6–12)
POTASSIUM SERPL-SCNC: 3.6 MMOL/L (ref 3.5–5.2)
PROT UR QL STRIP: NEGATIVE
RBC # BLD AUTO: 4.06 10*6/MM3 (ref 3.77–5.28)
RBC # UR STRIP: ABNORMAL /HPF
REF LAB TEST METHOD: ABNORMAL
SODIUM SERPL-SCNC: 139 MMOL/L (ref 136–145)
SP GR UR STRIP: 1.01 (ref 1–1.03)
SQUAMOUS #/AREA URNS HPF: ABNORMAL /HPF
UROBILINOGEN UR QL STRIP: ABNORMAL
WBC # UR STRIP: ABNORMAL /HPF
WBC NRBC COR # BLD AUTO: 8.92 10*3/MM3 (ref 3.4–10.8)
WHOLE BLOOD HOLD COAG: NORMAL
WHOLE BLOOD HOLD SPECIMEN: NORMAL

## 2025-01-06 PROCEDURE — 99283 EMERGENCY DEPT VISIT LOW MDM: CPT

## 2025-01-06 PROCEDURE — 25010000002 KETOROLAC TROMETHAMINE PER 15 MG: Performed by: EMERGENCY MEDICINE

## 2025-01-06 PROCEDURE — 96374 THER/PROPH/DIAG INJ IV PUSH: CPT

## 2025-01-06 PROCEDURE — 85025 COMPLETE CBC W/AUTO DIFF WBC: CPT | Performed by: EMERGENCY MEDICINE

## 2025-01-06 PROCEDURE — 96375 TX/PRO/DX INJ NEW DRUG ADDON: CPT

## 2025-01-06 PROCEDURE — 80048 BASIC METABOLIC PNL TOTAL CA: CPT | Performed by: EMERGENCY MEDICINE

## 2025-01-06 PROCEDURE — 25010000002 ONDANSETRON PER 1 MG: Performed by: EMERGENCY MEDICINE

## 2025-01-06 PROCEDURE — 81001 URINALYSIS AUTO W/SCOPE: CPT | Performed by: EMERGENCY MEDICINE

## 2025-01-06 RX ORDER — SULFAMETHOXAZOLE AND TRIMETHOPRIM 800; 160 MG/1; MG/1
1 TABLET ORAL ONCE
Status: COMPLETED | OUTPATIENT
Start: 2025-01-06 | End: 2025-01-06

## 2025-01-06 RX ORDER — PROMETHAZINE HYDROCHLORIDE 12.5 MG/1
12.5 TABLET ORAL EVERY 6 HOURS PRN
Qty: 12 TABLET | Refills: 0 | Status: SHIPPED | OUTPATIENT
Start: 2025-01-06

## 2025-01-06 RX ORDER — KETOROLAC TROMETHAMINE 30 MG/ML
30 INJECTION, SOLUTION INTRAMUSCULAR; INTRAVENOUS ONCE
Status: COMPLETED | OUTPATIENT
Start: 2025-01-06 | End: 2025-01-06

## 2025-01-06 RX ORDER — LIDOCAINE HYDROCHLORIDE AND EPINEPHRINE 10; 10 MG/ML; UG/ML
10 INJECTION, SOLUTION INFILTRATION; PERINEURAL ONCE
Status: DISCONTINUED | OUTPATIENT
Start: 2025-01-06 | End: 2025-01-07 | Stop reason: HOSPADM

## 2025-01-06 RX ORDER — SULFAMETHOXAZOLE AND TRIMETHOPRIM 800; 160 MG/1; MG/1
1 TABLET ORAL 2 TIMES DAILY
Qty: 14 TABLET | Refills: 0 | Status: SHIPPED | OUTPATIENT
Start: 2025-01-06

## 2025-01-06 RX ORDER — ONDANSETRON 2 MG/ML
4 INJECTION INTRAMUSCULAR; INTRAVENOUS ONCE
Status: COMPLETED | OUTPATIENT
Start: 2025-01-06 | End: 2025-01-06

## 2025-01-06 RX ORDER — ESOMEPRAZOLE MAGNESIUM 40 MG/1
40 CAPSULE, DELAYED RELEASE ORAL 2 TIMES DAILY
Qty: 90 CAPSULE | Refills: 1 | Status: SHIPPED | OUTPATIENT
Start: 2025-01-06

## 2025-01-06 RX ORDER — CEPHALEXIN 500 MG/1
500 CAPSULE ORAL 4 TIMES DAILY
Qty: 28 CAPSULE | Refills: 0 | Status: SHIPPED | OUTPATIENT
Start: 2025-01-06

## 2025-01-06 RX ORDER — KETOROLAC TROMETHAMINE 10 MG/1
10 TABLET, FILM COATED ORAL EVERY 6 HOURS PRN
Qty: 20 TABLET | Refills: 0 | Status: SHIPPED | OUTPATIENT
Start: 2025-01-06

## 2025-01-06 RX ORDER — ONDANSETRON 4 MG/1
4 TABLET, ORALLY DISINTEGRATING ORAL EVERY 6 HOURS PRN
Qty: 12 TABLET | Refills: 0 | Status: SHIPPED | OUTPATIENT
Start: 2025-01-06

## 2025-01-06 RX ADMIN — ONDANSETRON 4 MG: 2 INJECTION INTRAMUSCULAR; INTRAVENOUS at 22:07

## 2025-01-06 RX ADMIN — Medication 5 ML: at 22:10

## 2025-01-06 RX ADMIN — KETOROLAC TROMETHAMINE 30 MG: 30 INJECTION, SOLUTION INTRAMUSCULAR; INTRAVENOUS at 22:08

## 2025-01-06 RX ADMIN — SULFAMETHOXAZOLE AND TRIMETHOPRIM 1 TABLET: 800; 160 TABLET ORAL at 23:45

## 2025-01-06 RX ADMIN — CEPHALEXIN 500 MG: 250 CAPSULE ORAL at 23:45

## 2025-01-06 NOTE — TELEPHONE ENCOUNTER
Rx Refill Note  Requested Prescriptions     Pending Prescriptions Disp Refills    esomeprazole (nexIUM) 40 MG capsule [Pharmacy Med Name: ESOMEPRAZOLE MAG DR 40 MG CAP] 90 capsule 0     Sig: TAKE 1 CAPSULE BY MOUTH 2 TIMES A DAY      Last office visit with prescribing clinician: 12/20/2024   Last telemedicine visit with prescribing clinician: Visit date not found   Next office visit with prescribing clinician: Visit date not found                         Would you like a call back once the refill request has been completed: [] Yes [] No    If the office needs to give you a call back, can they leave a voicemail: [] Yes [] No    Yenifer Strong MA  01/06/25, 08:51 EST

## 2025-01-07 NOTE — DISCHARGE INSTRUCTIONS
Add Miralax half a capful or half a packet daily until stool is soft and you are having regular bowel movements.

## 2025-01-07 NOTE — ED PROVIDER NOTES
Subjective   History of Present Illness  44 year old female presents to the emergency department on post op day #14 status post labiaplasty by Dr. Bonny Badillo on 12/23/24, presents to the emergency department with concerns about worsening pain and drainage from the surgical area. The patient states she has not slept for the past three nights due to pain. She reports three days of associated nausea and fatigue. The patient states she didn't call her doctor, states she did not have a good experience with Dr. Badillo. The patient states she though she was having a laparoscopic bilateral tubal ligation and a trimming of a labia minora, but states she had bilateral salpingectomy instead, and more surgery on her labia majora than she expected and it feels too tight. She states there was extensive swelling and bruising and she was told the sutures were supposed to dissolve. She states she has tried to remove the sutures herself but has too much pain when she tries. She is asking if I can remove the sutures as there is pain, and states that tonight she saw what she describes as thick purulent discharge from the sutured wound in the posterior vaginal area.  She reports subjective fever, but denies measured fever.      Review of Systems   Constitutional:  Positive for fatigue. Negative for diaphoresis.   HENT:  Negative for facial swelling and nosebleeds.    Eyes:  Negative for photophobia and discharge.   Respiratory:  Negative for stridor.    Gastrointestinal:  Positive for nausea.        Having bowel movements since the surgery. She states her abdomen still feels distended to her since the surgery.   Genitourinary:  Positive for vaginal pain. Negative for difficulty urinating.   Neurological:  Negative for speech difficulty.       Past Medical History:   Diagnosis Date    Anxiety     GERD (gastroesophageal reflux disease)    History of H pylori, completed the treatment course per patient    Allergies   Allergen Reactions     Sertraline Unknown - High Severity    Tetracycline Other (See Comments)     Blurry vision        Past Surgical History:   Procedure Laterality Date    BREAST SURGERY      CHOLECYSTECTOMY      COLONOSCOPY         Family History   Problem Relation Age of Onset    Alcohol abuse Mother     Drug abuse Mother     Alcohol abuse Father        Social History     Socioeconomic History    Marital status: Single   Tobacco Use    Smoking status: Never    Smokeless tobacco: Never   Vaping Use    Vaping status: Never Used   Substance and Sexual Activity    Alcohol use: Yes     Comment: occ    Drug use: Never    Sexual activity: Defer           Objective   Physical Exam  Vitals and nursing note reviewed.   Constitutional:       General: She is not in acute distress.     Appearance: She is not diaphoretic.   HENT:      Mouth/Throat:      Mouth: Mucous membranes are moist.   Eyes:      General: No scleral icterus.     Comments: No photophobia.   Cardiovascular:      Rate and Rhythm: Normal rate.   Pulmonary:      Effort: Pulmonary effort is normal. No respiratory distress.      Breath sounds: No stridor.   Abdominal:      General: There is no distension.      Palpations: Abdomen is soft.      Tenderness: There is no abdominal tenderness. There is no guarding.      Comments: Laparoscopic wounds clean/dry/intact without signs of infection.   Genitourinary:     Comments: Chaperones present, benjamin Rios ED, RN. There is ecchymosis to the clitoral galaivz. No significant edema, erythema, or induration appreciated. I am able to visualize some sutures to the labia majora. I do not appreciate any purulent discharge. There is some tenderness to palpation to the labia minora.   Musculoskeletal:         General: No swelling or deformity.      Cervical back: Neck supple. No rigidity.   Skin:     General: Skin is warm and dry.      Coloration: Skin is not jaundiced.   Neurological:      Mental Status: She is alert.      Comments:  Normal speech, no dysarthria. No facial droop.         Procedures - I removed two sutures from her labia majora per her request. She states the pain is improved after doing so. There is one deeper suture I am able to visualize but unable to remove. That was left in place.           ED Course  ED Course as of 01/10/25 1611   Mon Jan 06, 2025 2208 WBC, UA(!): 11-20 [LD]   2208 Bacteria, UA: None Seen [LD]   2208 WBC: 8.92 [LD]   2208 Hemoglobin: 13.2 [LD]   2208 Platelets: 240 [LD]   2208 Leukocytes, UA(!): Small (1+) [LD]   2208 Nitrite, UA: Negative [LD]   2208 Protein, UA: Negative [LD]   2208 Blood, UA(!): Trace [LD]   2208 Bilirubin, UA: Negative [LD]   2208 Ketones, UA: Negative [LD]   2208 Glucose: Negative [LD]   2208 Glucose(!): 100 [LD]   2208 BUN: 14 [LD]   2208 Creatinine(!): 0.54 [LD]   2208 Sodium: 139 [LD]   2208 Potassium: 3.6 [LD]   2208 Chloride: 104 [LD]   2208 CO2: 23.0 [LD]   2208 Calcium: 9.3 [LD]   2208 eGFR: 117.3 [LD]      ED Course User Index  [LD] Hayde Little MD                                         The patient has a follow up appointment in about four more weeks with her OBGYN and was advised to keep that follow up appointment.  She feels better after Toradol.               Medical Decision Making  Differential diagnosis includes post-operative pain, stitch abscess, less likely wound infection, and others.     Problems Addressed:  Post-operative pain: complicated acute illness or injury    Amount and/or Complexity of Data Reviewed  Labs: ordered. Decision-making details documented in ED Course.    Risk  Prescription drug management.      Recent Results (from the past 24 hours)   Green Top (Gel)    Collection Time: 01/06/25  8:48 PM   Result Value Ref Range    Extra Tube Hold for add-ons.    Lavender Top    Collection Time: 01/06/25  8:48 PM   Result Value Ref Range    Extra Tube hold for add-on    Gold Top - SST    Collection Time: 01/06/25  8:48 PM   Result Value Ref Range     Extra Tube Hold for add-ons.    Gray Top    Collection Time: 01/06/25  8:48 PM   Result Value Ref Range    Extra Tube Hold for add-ons.    Light Blue Top    Collection Time: 01/06/25  8:48 PM   Result Value Ref Range    Extra Tube Hold for add-ons.    Basic Metabolic Panel    Collection Time: 01/06/25  8:48 PM    Specimen: Blood   Result Value Ref Range    Glucose 100 (H) 65 - 99 mg/dL    BUN 14 6 - 20 mg/dL    Creatinine 0.54 (L) 0.57 - 1.00 mg/dL    Sodium 139 136 - 145 mmol/L    Potassium 3.6 3.5 - 5.2 mmol/L    Chloride 104 98 - 107 mmol/L    CO2 23.0 22.0 - 29.0 mmol/L    Calcium 9.3 8.6 - 10.5 mg/dL    BUN/Creatinine Ratio 25.9 (H) 7.0 - 25.0    Anion Gap 12.0 5.0 - 15.0 mmol/L    eGFR 117.3 >60.0 mL/min/1.73   CBC Auto Differential    Collection Time: 01/06/25  8:48 PM    Specimen: Blood   Result Value Ref Range    WBC 8.92 3.40 - 10.80 10*3/mm3    RBC 4.06 3.77 - 5.28 10*6/mm3    Hemoglobin 13.2 12.0 - 15.9 g/dL    Hematocrit 38.9 34.0 - 46.6 %    MCV 95.8 79.0 - 97.0 fL    MCH 32.5 26.6 - 33.0 pg    MCHC 33.9 31.5 - 35.7 g/dL    RDW 11.9 (L) 12.3 - 15.4 %    RDW-SD 42.0 37.0 - 54.0 fl    MPV 11.0 6.0 - 12.0 fL    Platelets 240 140 - 450 10*3/mm3    Neutrophil % 66.2 42.7 - 76.0 %    Lymphocyte % 23.2 19.6 - 45.3 %    Monocyte % 8.0 5.0 - 12.0 %    Eosinophil % 1.8 0.3 - 6.2 %    Basophil % 0.6 0.0 - 1.5 %    Immature Grans % 0.2 0.0 - 0.5 %    Neutrophils, Absolute 5.91 1.70 - 7.00 10*3/mm3    Lymphocytes, Absolute 2.07 0.70 - 3.10 10*3/mm3    Monocytes, Absolute 0.71 0.10 - 0.90 10*3/mm3    Eosinophils, Absolute 0.16 0.00 - 0.40 10*3/mm3    Basophils, Absolute 0.05 0.00 - 0.20 10*3/mm3    Immature Grans, Absolute 0.02 0.00 - 0.05 10*3/mm3    nRBC 0.0 0.0 - 0.2 /100 WBC   Urinalysis With Microscopic If Indicated (No Culture) - Urine, Clean Catch    Collection Time: 01/06/25  9:50 PM    Specimen: Urine, Clean Catch   Result Value Ref Range    Color, UA Yellow Yellow, Straw    Appearance, UA Clear Clear     "pH, UA 6.0 5.0 - 8.0    Specific Gravity, UA 1.011 1.001 - 1.030    Glucose, UA Negative Negative    Ketones, UA Negative Negative    Bilirubin, UA Negative Negative    Blood, UA Trace (A) Negative    Protein, UA Negative Negative    Leuk Esterase, UA Small (1+) (A) Negative    Nitrite, UA Negative Negative    Urobilinogen, UA 0.2 E.U./dL 0.2 - 1.0 E.U./dL   Urinalysis, Microscopic Only - Urine, Clean Catch    Collection Time: 01/06/25  9:50 PM    Specimen: Urine, Clean Catch   Result Value Ref Range    RBC, UA 0-2 None Seen, 0-2 /HPF    WBC, UA 11-20 (A) None Seen, 0-2 /HPF    Bacteria, UA None Seen None Seen, Trace /HPF    Squamous Epithelial Cells, UA 3-6 (A) None Seen, 0-2 /HPF    Hyaline Casts, UA 0-6 0 - 6 /LPF    Methodology Automated Microscopy      Note: In addition to lab results from this visit, the labs listed above may include labs taken at another facility or during a different encounter within the last 24 hours. Please correlate lab times with ED admission and discharge times for further clarification of the services performed during this visit.    No orders to display     Vitals:    01/06/25 2025 01/06/25 2049 01/06/25 2100 01/06/25 2130   BP: 108/74 104/86 107/82 117/85   BP Location: Left arm      Patient Position: Sitting      Pulse: 79  67    Resp: 17      Temp: 98.6 °F (37 °C)      TempSrc: Oral      SpO2: 96%  96%    Weight: 57.6 kg (127 lb)      Height: 167.6 cm (66\")        Medications   lidocaine 1% - EPINEPHrine 1:651614 (XYLOCAINE W/EPI) 1 %-1:948535 injection 10 mL (has no administration in time range)   cephalexin (KEFLEX) capsule 500 mg (has no administration in time range)   sulfamethoxazole-trimethoprim (BACTRIM DS,SEPTRA DS) 800-160 MG per tablet 1 tablet (has no administration in time range)   Lido-EPINEPHrine-Tetracaine 4-0.18-0.5 % gel 5 mL (5 mL Topical Given 1/6/25 2210)   ketorolac (TORADOL) injection 30 mg (30 mg Intravenous Given 1/6/25 2208)   ondansetron (ZOFRAN) injection " 4 mg (4 mg Intravenous Given 1/6/25 2207)     ECG/EMG Results (last 24 hours)       ** No results found for the last 24 hours. **          No orders to display           Final diagnoses:   Post-operative pain       ED Disposition  ED Disposition       ED Disposition   Discharge    Condition   Stable    Comment   --               Stephen Brooks MD  [address]  [facility] [address]  [place] [address]  [phone]    Schedule an appointment as soon as possible for a visit in 1 week  primary care provider    Bonny Badillo MD  [address]  [facility] [address]  [place] [address]  [phone]      Follow up in one month as scheduled for your 6-weeks post operative visit.         Medication List        New Prescriptions      cephalexin 500 MG capsule  Commonly known as: KEFLEX  Take 1 capsule by mouth 4 (Four) Times a Day.     ketorolac 10 MG tablet  Commonly known as: TORADOL  Take 1 tablet by mouth Every 6 (Six) Hours As Needed for Moderate Pain.     ondansetron ODT 4 MG disintegrating tablet  Commonly known as: ZOFRAN-ODT  Place 1 tablet on the tongue Every 6 (Six) Hours As Needed for Nausea or Vomiting. As needed for nausea; can cause constipation     promethazine 12.5 MG tablet  Commonly known as: PHENERGAN  Take 1 tablet by mouth Every 6 (Six) Hours As Needed for Nausea. Will cause drowsiness     sulfamethoxazole-trimethoprim 800-160 MG per tablet  Commonly known as: BACTRIM DS,SEPTRA DS  Take 1 tablet by mouth 2 (Two) Times a Day.            Changed      Xyzal Allergy 24HR 5 MG tablet  Generic drug: levocetirizine  What changed: additional instructions               Where to Get Your Medications        These medications were sent to [facility] [id number] - [place], [place] - [address] [place] [address] [address] - [phone]  - [phone] [place] [id number]      Phone: [phone]   cephalexin 500 MG capsule  ketorolac 10 MG tablet  ondansetron ODT 4 MG disintegrating  tablet  promethazine 12.5 MG tablet  sulfamethoxazole-trimethoprim 800-160 MG per tablet            Hayde Little MD  01/10/25 6061

## 2025-01-14 ENCOUNTER — OFFICE VISIT (OUTPATIENT)
Age: 45
End: 2025-01-14
Payer: COMMERCIAL

## 2025-01-14 ENCOUNTER — TELEPHONE (OUTPATIENT)
Dept: OBSTETRICS AND GYNECOLOGY | Facility: CLINIC | Age: 45
End: 2025-01-14

## 2025-01-14 VITALS
OXYGEN SATURATION: 97 % | BODY MASS INDEX: 20.76 KG/M2 | DIASTOLIC BLOOD PRESSURE: 66 MMHG | HEART RATE: 92 BPM | SYSTOLIC BLOOD PRESSURE: 120 MMHG | HEIGHT: 66 IN | WEIGHT: 129.2 LBS

## 2025-01-14 DIAGNOSIS — B96.81 HELICOBACTER PYLORI GASTRITIS: ICD-10-CM

## 2025-01-14 DIAGNOSIS — N89.8 VAGINAL DISCHARGE: Primary | ICD-10-CM

## 2025-01-14 DIAGNOSIS — K29.70 HELICOBACTER PYLORI GASTRITIS: ICD-10-CM

## 2025-01-14 DIAGNOSIS — Z48.02 VISIT FOR SUTURE REMOVAL: ICD-10-CM

## 2025-01-14 PROCEDURE — 99214 OFFICE O/P EST MOD 30 MIN: CPT | Performed by: STUDENT IN AN ORGANIZED HEALTH CARE EDUCATION/TRAINING PROGRAM

## 2025-01-14 NOTE — PROGRESS NOTES
Office Note     Name: Marielos Moore    : 1980     MRN: [id number]     Chief Complaint  Vaginitis and Suture / Staple Removal (Possible to take out remaining stitches)    Subjective     History of Present Illness:      History of Present Illness  The patient is a 44-year-old female here to follow up on vaginal issues.    She underwent a tubal ligation and labioplasty on 2024. Post-surgery, she experienced significant discomfort due to tight sutures, impeding mobility. ER visit led to partial suture removal, alleviating pressure. Gynecologist removed 11 more sutures 2 days later.  On Bactrim and another antibiotic for suspected infection of her labia with pus.     Reports irritation, itching, and clear/yellowish discharge concerning for a possible yeast infection. Last day of antibiotics today for labial infection post surgery.     History of one yeast infection during pregnancy 25 years ago. Recently completed antibiotics for H. pylori.         Seeking confirmation of yeast infection and suture removal. Reports discomfort, no pain. Sutures not dissolved, difficulty with bowel movements, taking fiber supplements and MiraLAX.        Advised to fast for 2 hours before H. pylori breath test, not currently fasting. Drinking a lot of water, plans to return for breath test tomorrow.           Past Medical History:   Past Medical History:   Diagnosis Date    Anxiety     GERD (gastroesophageal reflux disease)        Past Surgical History:   Past Surgical History:   Procedure Laterality Date    BREAST SURGERY      CHOLECYSTECTOMY      COLONOSCOPY         Immunizations:   Immunization History   Administered Date(s) Administered    COVID-19 (MODERNA) 1st,2nd,3rd Dose Monovalent 2021, 2021        Medications:     Current Outpatient Medications:     cephalexin (KEFLEX) 500 MG capsule, Take 1 capsule by mouth 4 (Four) Times a Day., Disp: 28 capsule, Rfl: 0    clonazePAM (KlonoPIN) 1 MG tablet, Take  "1 tablet by mouth At Night As Needed for Anxiety., Disp: 30 tablet, Rfl: 0    levocetirizine (Xyzal Allergy 24HR) 5 MG tablet, Take 1 tablet by mouth Daily. (Patient taking differently: Take 1 tablet by mouth Daily. PRN), Disp: , Rfl:     sulfamethoxazole-trimethoprim (BACTRIM DS,SEPTRA DS) 800-160 MG per tablet, Take 1 tablet by mouth 2 (Two) Times a Day., Disp: 14 tablet, Rfl: 0    esomeprazole (nexIUM) 40 MG capsule, TAKE 1 CAPSULE BY MOUTH 2 TIMES A DAY (Patient not taking: Reported on 1/14/2025), Disp: 90 capsule, Rfl: 1    ketorolac (TORADOL) 10 MG tablet, Take 1 tablet by mouth Every 6 (Six) Hours As Needed for Moderate Pain. (Patient not taking: Reported on 1/14/2025), Disp: 20 tablet, Rfl: 0    ondansetron ODT (ZOFRAN-ODT) 4 MG disintegrating tablet, Place 1 tablet on the tongue Every 6 (Six) Hours As Needed for Nausea or Vomiting. As needed for nausea; can cause constipation (Patient not taking: Reported on 1/14/2025), Disp: 12 tablet, Rfl: 0    promethazine (PHENERGAN) 12.5 MG tablet, Take 1 tablet by mouth Every 6 (Six) Hours As Needed for Nausea. Will cause drowsiness (Patient not taking: Reported on 1/14/2025), Disp: 12 tablet, Rfl: 0    Allergies:   Allergies   Allergen Reactions    Sertraline Unknown - High Severity    Tetracycline Other (See Comments)     Blurry vision        Family History:   Family History   Problem Relation Age of Onset    Alcohol abuse Mother     Drug abuse Mother     Alcohol abuse Father        Social History:   Social History     Socioeconomic History    Marital status: Single   Tobacco Use    Smoking status: Never    Smokeless tobacco: Never   Vaping Use    Vaping status: Never Used   Substance and Sexual Activity    Alcohol use: Yes     Comment: occ    Drug use: Never    Sexual activity: Defer         Objective     Vital Signs  /66 (BP Location: Right arm, Patient Position: Sitting, Cuff Size: Adult)   Pulse 92   Ht 167.6 cm (65.98\")   Wt 58.6 kg (129 lb 3.2 oz)   " "SpO2 97%   BMI 20.86 kg/m²   Estimated body mass index is 20.86 kg/m² as calculated from the following:    Height as of this encounter: 167.6 cm (65.98\").    Weight as of this encounter: 58.6 kg (129 lb 3.2 oz).    BMI is within normal parameters. No other follow-up for BMI required.      Physical Exam  Vitals reviewed. Exam conducted with a chaperone present.   Constitutional:       General: She is not in acute distress.     Appearance: Normal appearance. She is not ill-appearing.   Pulmonary:      Effort: Pulmonary effort is normal. No respiratory distress.   Genitourinary:     Exam position: Supine.      Labia:         Right: No rash, lesion or injury.         Left: No rash, lesion or injury.       Cervix: Discharge present.          Comments: No erythema of labia, 3 visible sutures   Able to remove 2 of the sutures   Unable to remove blue circled suture, unable to find knot to remove     Yellow/white thick discharge present from cervical os   No white discharge present on vaginal walls     Neurological:      General: No focal deficit present.      Mental Status: She is alert and oriented to person, place, and time.   Psychiatric:         Mood and Affect: Mood normal.         Behavior: Behavior normal.          Assessment and Plan     Diagnoses and all orders for this visit:    1. Vaginal discharge (Primary)  -     NuSwab VG+ - Swab, Vagina; Future    2. Helicobacter pylori gastritis    3. Visit for suture removal    Other orders  -     Suture Removal         Assessment & Plan  Retained sutures after labioplasty   - Underwent tubal ligation and labioplasty on 12/23/2024  - Reports she did not consent to fallopian tube removal, only ligation   - Significant pain and suture rejection led to ER visit for partial suture removal  - Continues to experience discomfort and irritation, with some sutures still present  - Two sutures removed during this visit, one deeply embedded suture remains  - Advised to consult  with " the OBGYN who performed the surgery for removal of the last suture.     Vaginal discharge   - Reports irritation and itching, possibly indicative of yeast infection especially given recent antibiotic use   - Advised to wait for lab results before starting fluconazole due to recent extensive antibiotic use  - Prescription for fluconazole if lab confirms yeast infection, NuSwab VG + performed       H. pylori follow-up  - Completed antibiotics for H. pylori  - Needs breath test to confirm eradication  - Instructed to fast for 2 hours before test  - Can return to clinic for test at convenience    PROCEDURE  The patient underwent a tubal ligation and labioplasty on 12/23/2024.  Suture Removal    Date/Time: 1/14/2025 2:05 PM    Performed by: Kanchan Wu MD  Authorized by: Kanchan Wu MD  Body area: anogenital  Location details: vulva  Wound Appearance: clean  Sutures Removed: 2  Patient tolerance: patient tolerated the procedure well with no immediate complications  Comments: Unable to remove one suture, see physical exam for location             Follow Up  No follow-ups on file.    Patient or patient representative verbalized consent for the use of Ambient Listening during the visit with  Kanchan Wu MD for chart documentation. 1/14/2025  14:05 EST      Kanchan Wu MD   [facility]  [address] [facility] [place] [address] [address] [phone]    Please note that portions of this document may have been completed with a voice recognition program.      At [facility], we believe that sharing information builds trust and better relationships. You are receiving this note because you are receiving care at [facility] or have recently visited. It is possible you will see health information before a provider has talked with you about it. This kind of information can be easy to misunderstand as it is  a medical document. It is intended as vmeo-gc-[id number] communication. It is written in medical language and may contain abbreviations or verbiage that are unfamiliar. It may appear blunt or direct. Medical documents are intended to carry relevant information, facts as evident, and the clinical opinion of the practitioner.  To help you fully understand what it means for your health, we urge you to discuss this note with your provider.

## 2025-01-15 ENCOUNTER — PATIENT MESSAGE (OUTPATIENT)
Age: 45
End: 2025-01-15
Payer: COMMERCIAL

## 2025-01-15 DIAGNOSIS — Z98.890 H/O VAGINAL SURGERY: Primary | ICD-10-CM

## 2025-01-17 LAB
A VAGINAE DNA VAG QL NAA+PROBE: NORMAL SCORE
BVAB2 DNA VAG QL NAA+PROBE: NORMAL SCORE
C ALBICANS DNA VAG QL NAA+PROBE: NEGATIVE
C GLABRATA DNA VAG QL NAA+PROBE: NEGATIVE
C TRACH DNA SPEC QL NAA+PROBE: NEGATIVE
MEGA1 DNA VAG QL NAA+PROBE: NORMAL SCORE
N GONORRHOEA DNA VAG QL NAA+PROBE: NEGATIVE
T VAGINALIS DNA VAG QL NAA+PROBE: NEGATIVE

## 2025-01-20 ENCOUNTER — LAB (OUTPATIENT)
Age: 45
End: 2025-01-20
Payer: COMMERCIAL

## 2025-01-20 DIAGNOSIS — B96.81 HELICOBACTER PYLORI GASTRITIS: ICD-10-CM

## 2025-01-20 DIAGNOSIS — K29.70 HELICOBACTER PYLORI GASTRITIS: ICD-10-CM

## 2025-01-21 ENCOUNTER — TELEPHONE (OUTPATIENT)
Dept: OBSTETRICS AND GYNECOLOGY | Facility: CLINIC | Age: 45
End: 2025-01-21
Payer: COMMERCIAL

## 2025-01-21 ENCOUNTER — HOSPITAL ENCOUNTER (EMERGENCY)
Facility: HOSPITAL | Age: 45
Discharge: HOME | End: 2025-01-21
Attending: EMERGENCY MEDICINE
Payer: COMMERCIAL

## 2025-01-21 ENCOUNTER — APPOINTMENT (OUTPATIENT)
Dept: OTHER | Facility: HOSPITAL | Age: 45
End: 2025-01-21
Payer: COMMERCIAL

## 2025-01-21 VITALS
WEIGHT: 130 LBS | TEMPERATURE: 98.8 F | DIASTOLIC BLOOD PRESSURE: 75 MMHG | RESPIRATION RATE: 16 BRPM | HEART RATE: 60 BPM | SYSTOLIC BLOOD PRESSURE: 100 MMHG | HEIGHT: 66 IN | OXYGEN SATURATION: 96 % | BODY MASS INDEX: 20.89 KG/M2

## 2025-01-21 DIAGNOSIS — Z48.89 ENCOUNTER FOR POST SURGICAL WOUND CHECK: ICD-10-CM

## 2025-01-21 DIAGNOSIS — R10.2 VULVOVAGINAL DISCOMFORT: Primary | ICD-10-CM

## 2025-01-21 DIAGNOSIS — Z48.02 VISIT FOR SUTURE REMOVAL: ICD-10-CM

## 2025-01-21 LAB
ALBUMIN SERPL-MCNC: 4 G/DL (ref 3.5–5.2)
ALBUMIN/GLOB SERPL: 1.5 G/DL
ALP SERPL-CCNC: 68 U/L (ref 39–117)
ALT SERPL W P-5'-P-CCNC: 22 U/L (ref 1–33)
ANION GAP SERPL CALCULATED.3IONS-SCNC: 8 MMOL/L (ref 5–15)
AST SERPL-CCNC: 18 U/L (ref 1–32)
BACTERIA UR QL AUTO: ABNORMAL /HPF
BASOPHILS # BLD AUTO: 0.06 10*3/MM3 (ref 0–0.2)
BASOPHILS NFR BLD AUTO: 0.9 % (ref 0–1.5)
BILIRUB SERPL-MCNC: 0.2 MG/DL (ref 0–1.2)
BILIRUB UR QL STRIP: NEGATIVE
BUN SERPL-MCNC: 15 MG/DL (ref 6–20)
BUN/CREAT SERPL: 24.6 (ref 7–25)
CALCIUM SPEC-SCNC: 9.1 MG/DL (ref 8.6–10.5)
CHLORIDE SERPL-SCNC: 106 MMOL/L (ref 98–107)
CLARITY UR: ABNORMAL
CO2 SERPL-SCNC: 27 MMOL/L (ref 22–29)
COLOR UR: YELLOW
CREAT SERPL-MCNC: 0.61 MG/DL (ref 0.57–1)
DEPRECATED RDW RBC AUTO: 42.4 FL (ref 37–54)
EGFRCR SERPLBLD CKD-EPI 2021: 113.9 ML/MIN/1.73
EOSINOPHIL # BLD AUTO: 0.12 10*3/MM3 (ref 0–0.4)
EOSINOPHIL NFR BLD AUTO: 1.8 % (ref 0.3–6.2)
ERYTHROCYTE [DISTWIDTH] IN BLOOD BY AUTOMATED COUNT: 12.2 % (ref 12.3–15.4)
GLOBULIN UR ELPH-MCNC: 2.6 GM/DL
GLUCOSE SERPL-MCNC: 101 MG/DL (ref 65–99)
GLUCOSE UR STRIP-MCNC: NEGATIVE MG/DL
HCT VFR BLD AUTO: 38.6 % (ref 34–46.6)
HGB BLD-MCNC: 12.8 G/DL (ref 12–15.9)
HGB UR QL STRIP.AUTO: ABNORMAL
HYALINE CASTS UR QL AUTO: ABNORMAL /LPF
IMM GRANULOCYTES # BLD AUTO: 0.02 10*3/MM3 (ref 0–0.05)
IMM GRANULOCYTES NFR BLD AUTO: 0.3 % (ref 0–0.5)
KETONES UR QL STRIP: NEGATIVE
LEUKOCYTE ESTERASE UR QL STRIP.AUTO: NEGATIVE
LYMPHOCYTES # BLD AUTO: 2.09 10*3/MM3 (ref 0.7–3.1)
LYMPHOCYTES NFR BLD AUTO: 31.3 % (ref 19.6–45.3)
MCH RBC QN AUTO: 31.8 PG (ref 26.6–33)
MCHC RBC AUTO-ENTMCNC: 33.2 G/DL (ref 31.5–35.7)
MCV RBC AUTO: 95.8 FL (ref 79–97)
MONOCYTES # BLD AUTO: 0.64 10*3/MM3 (ref 0.1–0.9)
MONOCYTES NFR BLD AUTO: 9.6 % (ref 5–12)
NEUTROPHILS NFR BLD AUTO: 3.75 10*3/MM3 (ref 1.7–7)
NEUTROPHILS NFR BLD AUTO: 56.1 % (ref 42.7–76)
NITRITE UR QL STRIP: NEGATIVE
NRBC BLD AUTO-RTO: 0 /100 WBC (ref 0–0.2)
PH UR STRIP.AUTO: 7.5 [PH] (ref 5–8)
PLATELET # BLD AUTO: 216 10*3/MM3 (ref 140–450)
PMV BLD AUTO: 9.8 FL (ref 6–12)
POTASSIUM SERPL-SCNC: 3.9 MMOL/L (ref 3.5–5.2)
PROT SERPL-MCNC: 6.6 G/DL (ref 6–8.5)
PROT UR QL STRIP: NEGATIVE
RBC # BLD AUTO: 4.03 10*6/MM3 (ref 3.77–5.28)
RBC # UR STRIP: ABNORMAL /HPF
REF LAB TEST METHOD: ABNORMAL
SODIUM SERPL-SCNC: 141 MMOL/L (ref 136–145)
SP GR UR STRIP: 1.06 (ref 1–1.03)
SQUAMOUS #/AREA URNS HPF: ABNORMAL /HPF
UROBILINOGEN UR QL STRIP: ABNORMAL
WBC # UR STRIP: ABNORMAL /HPF
WBC NRBC COR # BLD AUTO: 6.68 10*3/MM3 (ref 3.4–10.8)

## 2025-01-21 PROCEDURE — 99284 EMERGENCY DEPT VISIT MOD MDM: CPT

## 2025-01-21 PROCEDURE — 85025 COMPLETE CBC W/AUTO DIFF WBC: CPT

## 2025-01-21 PROCEDURE — 80053 COMPREHEN METABOLIC PANEL: CPT

## 2025-01-21 PROCEDURE — 81001 URINALYSIS AUTO W/SCOPE: CPT

## 2025-01-21 PROCEDURE — 25010000002 KETOROLAC TROMETHAMINE PER 15 MG: Performed by: EMERGENCY MEDICINE

## 2025-01-21 PROCEDURE — 96372 THER/PROPH/DIAG INJ SC/IM: CPT

## 2025-01-21 PROCEDURE — 36415 COLL VENOUS BLD VENIPUNCTURE: CPT

## 2025-01-21 RX ORDER — SODIUM CHLORIDE 0.9 % (FLUSH) 0.9 %
10 SYRINGE (ML) INJECTION AS NEEDED
Status: DISCONTINUED | OUTPATIENT
Start: 2025-01-21 | End: 2025-01-22 | Stop reason: HOSPADM

## 2025-01-21 RX ORDER — KETOROLAC TROMETHAMINE 15 MG/ML
15 INJECTION, SOLUTION INTRAMUSCULAR; INTRAVENOUS ONCE
Status: DISCONTINUED | OUTPATIENT
Start: 2025-01-21 | End: 2025-01-21

## 2025-01-21 RX ORDER — KETOROLAC TROMETHAMINE 15 MG/ML
15 INJECTION, SOLUTION INTRAMUSCULAR; INTRAVENOUS ONCE
Status: COMPLETED | OUTPATIENT
Start: 2025-01-21 | End: 2025-01-21

## 2025-01-21 RX ORDER — HYDROCODONE BITARTRATE AND ACETAMINOPHEN 7.5; 325 MG/1; MG/1
1 TABLET ORAL ONCE
Status: COMPLETED | OUTPATIENT
Start: 2025-01-21 | End: 2025-01-21

## 2025-01-21 RX ORDER — LIDOCAINE HYDROCHLORIDE 20 MG/ML
JELLY TOPICAL AS NEEDED
Status: DISCONTINUED | OUTPATIENT
Start: 2025-01-21 | End: 2025-01-22 | Stop reason: HOSPADM

## 2025-01-21 RX ADMIN — HYDROCODONE BITARTRATE AND ACETAMINOPHEN 1 TABLET: 7.5; 325 TABLET ORAL at 20:08

## 2025-01-21 RX ADMIN — KETOROLAC TROMETHAMINE 15 MG: 15 INJECTION, SOLUTION INTRAMUSCULAR; INTRAVENOUS at 20:08

## 2025-01-21 RX ADMIN — LIDOCAINE HYDROCHLORIDE: 20 JELLY TOPICAL at 20:08

## 2025-01-21 NOTE — TELEPHONE ENCOUNTER
Caller: Marielos Moore    Relationship: Self    Best call back number: [phone]    What is the best time to reach you: ASAP    Who are you requesting to speak with (clinical staff, provider,  specific staff member): CLINICAL    Do you know the name of the person who called: EDITH MANNING    What was the call regarding: MISSED CALL    Is it okay if the provider responds through MyChart: CALL BACK

## 2025-01-21 NOTE — TELEPHONE ENCOUNTER
"Provider: DR OSORIO    Caller: Marielos Moore    Relationship to Patient: Self    Phone Number: [phone]    Reason for Call: PT CALLED STATED SHE NEEDED TO SPEAK TO JOSR; SPOKE TO OFFICE AND THEY SAID DR OSORIO IS WAITING ON RECORDS; PT THEN TELLS ME SHE IS IN SO MUCH PAIN AND NEEDS TO BE SEEN AND SHE IS HAVING PUS COME OUT OF BOTTOM OF VAGINA; PT STATED SHE HAD A LABIAPLASTY DONE AND FALLOPIAN TUBES REMOVED;  THE DR THAT DID IT ALSO \"SOWED\" UP HER PERINEUM WHICH WAS NOT DISCUSSED; PT STATED SHE HAS SUTURES BY HER URETHRA AND SHE IS HAVING SHARP PAINS THERE; PT HAS BEEN TO ED; PT WANTS TO BE SEEN BY A GYN DR AND SHE DOES NOT WANT TO SEE THE DR THAT DID THE SX DUE TO DOING PROCEDURES THAT WERE NOT DISCUSSED PRIOR.    I CALLED OFFICE BACK; STATED TO SEND TE TO CLINICAL.  "

## 2025-01-21 NOTE — TELEPHONE ENCOUNTER
Patient states she spoke with someone last week. She states she is in a lot of pain and sutures are coming out. Patient advised to go to ED for evaluation and treatment. Instructed to call back to schedule appt to establish care. Pt ROSALIND.

## 2025-01-21 NOTE — Clinical Note
[facility] EMERGENCY DEPARTMENT  [address]  [facility] [address]  Phone: [phone]    Marielos Moore was seen and treated in our emergency department on 1/21/2025.  She may return to work on 01/23/2025.         Thank you for choosing [facility].    Ronnie Gleason, EDITH

## 2025-01-21 NOTE — ED PROVIDER NOTES
Subjective   History of Present Illness patient is a 44-year-old female who presents after a complicated postoperative course from a labioplasty on December 26 performed by Dr. Badillo at [facility].  She reports ongoing perineal pain, incomplete suture removal, pain with urination, low pelvic pain, and request additional sutures to be removed.  Reports her postoperative sutures are reportedly dissolvable, but she has had 3 medical evaluations postoperatively with sutures removed at each instance.  However, she reports they are remaining sutures near the urethral meatus apparently.    Review of Systems   Constitutional: Negative.    Respiratory: Negative.     Gastrointestinal:  Positive for constipation.   Genitourinary:  Positive for pelvic pain and vaginal pain.   Skin: Negative.    Neurological: Negative.    Psychiatric/Behavioral:  The patient is nervous/anxious.        Past Medical History:   Diagnosis Date    Anxiety     GERD (gastroesophageal reflux disease)        Allergies   Allergen Reactions    Sertraline Unknown - High Severity    Tetracycline Other (See Comments)     Blurry vision        Past Surgical History:   Procedure Laterality Date    BREAST SURGERY      CHOLECYSTECTOMY      COLONOSCOPY         Family History   Problem Relation Age of Onset    Alcohol abuse Mother     Drug abuse Mother     Alcohol abuse Father        Social History     Socioeconomic History    Marital status: Single   Tobacco Use    Smoking status: Never    Smokeless tobacco: Never   Vaping Use    Vaping status: Never Used   Substance and Sexual Activity    Alcohol use: Yes     Comment: occ    Drug use: Never    Sexual activity: Defer           Objective   Physical Exam  Exam conducted with a chaperone present.   Constitutional:       Appearance: Normal appearance. She is not toxic-appearing.   HENT:      Head: Normocephalic.   Eyes:      Extraocular Movements: Extraocular movements intact.      Conjunctiva/sclera:  Conjunctivae normal.      Pupils: Pupils are equal, round, and reactive to light.   Cardiovascular:      Rate and Rhythm: Normal rate.      Pulses: Normal pulses.   Pulmonary:      Effort: Pulmonary effort is normal.   Abdominal:      General: Abdomen is flat.      Palpations: Abdomen is soft.   Genitourinary:     Exam position: Supine.      Labia:         Right: Tenderness present.         Left: Tenderness present.       Comments: At least 2 vulvovaginal sutures removed, that were semiabsorbable, and 1 which came apart and approximately 4 pieces.  Patient reports feeling much better after they removed.  Musculoskeletal:         General: Normal range of motion.      Cervical back: Normal range of motion.   Skin:     General: Skin is warm and dry.      Capillary Refill: Capillary refill takes less than 2 seconds.   Neurological:      General: No focal deficit present.      Mental Status: She is alert and oriented to person, place, and time.         Suture Removal    Date/Time: 1/21/2025 10:35 PM    Performed by: Ronnie Gleason APRN  Authorized by: Ethan Ferreira MD    Consent:     Consent obtained:  Verbal    Consent given by:  Patient    Risks, benefits, and alternatives were discussed: yes      Risks discussed:  Bleeding, pain and wound separation    Alternatives discussed:  Alternative treatment, observation, referral, no treatment and delayed treatment  Universal protocol:     Procedure explained and questions answered to patient or proxy's satisfaction: yes      Relevant documents present and verified: yes      Required blood products, implants, devices, and special equipment available: yes      Site/side marked: yes      Immediately prior to procedure, a time out was called: yes      Patient identity confirmed:  Verbally with patient  Location:     Location:  Anogenital    Anogenital location:  Vulva  Procedure details:     Wound appearance:  No signs of infection, tender, moist and pink    Number of  sutures removed:  2  Post-procedure details:     Post-removal:  No dressing applied    Procedure completion:  Tolerated well, no immediate complications             ED Course  ED Course as of 01/21/25 2234 Tue Jan 21, 2025   1517 Patient initially evaluated in the ED pit area, we will need a private room, as well as having her changed into a gown and appropriate privacy provided for further examination []   1654 The patient's urinalysis demonstrates hematuria and some pyuria, with no bacteriuria seen, nitrite negative. []   1755 Updated patient as to awaiting a room and timing in the emergency department, she verbalized understanding.  The CBC and serum chemistry within normal limits. []      ED Course User Index  [] Ronnie Gleason APRN                                                       Medical Decision Making  Given the patient's complaints, her complicated postoperative course, and we will perform an examination with a private room is available, differential includes postoperative wound dehiscence, retained suture or postoperative material, cannot exclude pelvic abnormality, urinary tract infection.  Patient was serum screening labs, urinalysis, consideration for advanced imaging or the outside hospital imaging uploaded and interpreted today.  Patient is agreeable with this plan.  Will also offer analgesia.    Amount and/or Complexity of Data Reviewed  Labs: ordered.    Risk  Prescription drug management.        Final diagnoses:   Vulvovaginal discomfort   Encounter for post surgical wound check   Visit for suture removal       ED Disposition  ED Disposition       ED Disposition   Discharge    Condition   Stable    Comment   --               Stephen Brooks MD  [address]  [facility] [address]  [place] [address]  [phone]      As needed    Bonny Badillo MD  [address]  [facility] [address]  [place] [address]  [phone]               Medication List        New Prescriptions      lidocaine 2 %  jelly  Commonly known as: XYLOCAINE  Apply  topically to the appropriate area as directed 2 (Two) Times a Day.            Changed      Xyzal Allergy 24HR 5 MG tablet  Generic drug: levocetirizine  What changed: additional instructions               Where to Get Your Medications        These medications were sent to [facility] [id number] - [place], [place] - [address] [place] [address] [address] [place] - [phone] [facility] [phone] [place] [id number]      Phone: [phone]   lidocaine 2 % jellRonnie Cage, APRN  01/21/25 8172

## 2025-01-22 LAB — UREA BREATH TEST QL: NEGATIVE

## 2025-02-04 ENCOUNTER — OFFICE VISIT (OUTPATIENT)
Dept: OBSTETRICS AND GYNECOLOGY | Facility: CLINIC | Age: 45
End: 2025-02-04
Payer: COMMERCIAL

## 2025-02-04 VITALS
DIASTOLIC BLOOD PRESSURE: 70 MMHG | BODY MASS INDEX: 20.57 KG/M2 | WEIGHT: 128 LBS | SYSTOLIC BLOOD PRESSURE: 110 MMHG | HEIGHT: 66 IN

## 2025-02-04 DIAGNOSIS — N90.89 VULVAR IRRITATION: ICD-10-CM

## 2025-02-04 DIAGNOSIS — G89.18 POSTOPERATIVE PAIN: Primary | ICD-10-CM

## 2025-02-04 RX ORDER — ESTRADIOL 0.1 MG/G
CREAM VAGINAL
Qty: 1 EACH | Refills: 12 | Status: SHIPPED | OUTPATIENT
Start: 2025-02-04

## 2025-02-04 NOTE — PROGRESS NOTES
No chief complaint on file.      Subjective   HPI  Marielos Moore is a 44 y.o. female, . Her last LMP was No LMP recorded. Patient has had an ablation.. who is referred for post op vaginal surgery and pain. She had a D/C, Hysteroscopy and Novar sure ablation on 24 with Dr Bonny Badillo. She then had a bilateral salpingectomy and labia plasty on 24. She was not made aware that she was getting a salpingectomy, she thought it was a traditional tubal ligation. The perineum was also cut and a lot of her pain is at this site. She was seen in the ER on 25 for severe pain and was told she may need a revision. She states the perineum is very tight and painful.     Additional OB/GYN History     Last Completed Pap Smear       This patient has no relevant Health Maintenance data.              Last Completed Mammogram       This patient has no relevant Health Maintenance data.            Tobacco Usage?: No   OB History          2    Para   2    Term   2            AB        Living   2         SAB        IAB        Ectopic        Molar        Multiple        Live Births   2                  Current Outpatient Medications:     clonazePAM (KlonoPIN) 1 MG tablet, Take 1 tablet by mouth At Night As Needed for Anxiety., Disp: 30 tablet, Rfl: 0    estradiol (ESTRACE VAGINAL) 0.1 MG/GM vaginal cream, Insert 1 gm intravaginally 1-3 times each week, Disp: 1 each, Rfl: 12    lidocaine (XYLOCAINE) 2 % jelly, Apply  topically to the appropriate area as directed 2 (Two) Times a Day., Disp: 28.33 g, Rfl: 0     Past Medical History:   Diagnosis Date    Anxiety     GERD (gastroesophageal reflux disease)     Menorrhagia 2024        Past Surgical History:   Procedure Laterality Date    BREAST SURGERY      CHOLECYSTECTOMY      COLONOSCOPY      COMBINED HYSTEROSCOPY DIAGNOSTIC / D & C  2024    LABIAPLASTY  2024    SALPINGECTOMY Bilateral 2024       The additional following  "portions of the patient's history were reviewed and updated as appropriate: allergies, current medications, past family history, past medical history, past social history, past surgical history, and problem list.    Review of Systems   All other systems reviewed and are negative.      I have reviewed and agree with the HPI, ROS, and historical information as entered above. Diana Hernandez MD      Objective   /70   Ht 167.6 cm (66\")   Wt 58.1 kg (128 lb)   BMI 20.66 kg/m²     Physical Exam    Physical Exam:  General:  well developed; well nourished  no acute distress  mentation appropriate   Abdomen: soft, non-tender; no masses  no umbilical or inguinal hernias are present  no hepato-splenomegaly  incisions are clean, dry, intact, and without drainage   Pelvis: Clinical staff was present for exam  External genitalia:  normal appearance of the external genitalia including Bartholin's and [place]'s glands.  :  urethral meatus normal; urethra normal:  Vaginal:  normal pink mucosa without prolapse or lesions.  Perineum and labia healing well, but appears either labia majora at posterior forchette included with perineorrhaphy vs. Healed that way.  There are two skin bridges which are particularly sensitive        Assessment & Plan     Assessment     Problem List Items Addressed This Visit       Vulvar irritation    Postoperative pain - Primary         Plan     Reviewed CT - no read, but no significant gyn pathology that I see.  I have reviewed patient's old photos from when she was in ER and took some on her phone today to show her what it looks like today.  Reviewed she has skin bridge that I think if opened would alleviate her pain/pulling sensation. Reviewed other way to do this would be vaginal dilators and estrace.  If opening, can do in outpt surgery center vs. Under local in office. She prefers in office.   Need to schedule procedure to in office, put local, open skin bridge and possibly bovie or suture " if needed after.  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  Just need to schedule appropriately.  Recommend estrace cream until then.  Return for procedure.        Diana Hernandez MD  02/04/2025

## 2025-02-12 PROBLEM — N90.89 VULVAR IRRITATION: Status: ACTIVE | Noted: 2025-02-12

## 2025-02-12 PROBLEM — G89.18 POSTOPERATIVE PAIN: Status: ACTIVE | Noted: 2025-02-12

## 2025-02-13 ENCOUNTER — TELEPHONE (OUTPATIENT)
Dept: OBSTETRICS AND GYNECOLOGY | Facility: CLINIC | Age: 45
End: 2025-02-13

## 2025-02-13 NOTE — TELEPHONE ENCOUNTER
Provider: DR. CHOI    Caller: Marielos Moore    Relationship to Patient: Self    Pharmacy:     Phone Number: [phone]    Reason for Call: SCHEDULE SURGERY     When was the patient last seen: 02.04.25    PATIENT WOULD LIKE A CALL BACK FROM THE  OR HER NURSE IN REGARDS TO SCHEDULING HER SURGERY.     PATIENT CAN BE REACHED AT  [phone]    THANK YOU

## 2025-02-26 ENCOUNTER — PROCEDURE VISIT (OUTPATIENT)
Dept: OBSTETRICS AND GYNECOLOGY | Facility: CLINIC | Age: 45
End: 2025-02-26
Payer: COMMERCIAL

## 2025-02-26 VITALS
SYSTOLIC BLOOD PRESSURE: 100 MMHG | WEIGHT: 130 LBS | DIASTOLIC BLOOD PRESSURE: 72 MMHG | BODY MASS INDEX: 20.89 KG/M2 | HEIGHT: 66 IN

## 2025-02-26 DIAGNOSIS — N90.89 VULVAR IRRITATION: Primary | ICD-10-CM

## 2025-02-26 DIAGNOSIS — G89.18 POSTOPERATIVE PAIN: ICD-10-CM

## 2025-02-26 RX ORDER — SILVER SULFADIAZINE 10 MG/G
1 CREAM TOPICAL 2 TIMES DAILY
Qty: 25 G | Refills: 0 | Status: SHIPPED | OUTPATIENT
Start: 2025-02-26

## 2025-02-26 NOTE — PROGRESS NOTES
Chief Complaint   Patient presents with    Procedure       Subjective   HPI  Marielos Moore is a 44 y.o. female, . Her last LMP was No LMP recorded. Patient has had an ablation.. who presents for pain after perineorrhaphy on 24.           Additional OB/GYN History     Last Pap :  negative  Last Completed Pap Smear       This patient has no relevant Health Maintenance data.            Last mammogram:      Tobacco Usage?: No   OB History          2    Para   2    Term   2            AB        Living   2         SAB        IAB        Ectopic        Molar        Multiple        Live Births   2                  Current Outpatient Medications:     clonazePAM (KlonoPIN) 1 MG tablet, Take 1 tablet by mouth At Night As Needed for Anxiety., Disp: 30 tablet, Rfl: 0    estradiol (ESTRACE VAGINAL) 0.1 MG/GM vaginal cream, Insert 1 gm intravaginally 1-3 times each week, Disp: 1 each, Rfl: 12    lidocaine (XYLOCAINE) 2 % jelly, Apply  topically to the appropriate area as directed 2 (Two) Times a Day., Disp: 28.33 g, Rfl: 0    silver sulfadiazine (SILVADENE, SSD) 1 % cream, Apply 1 Application topically to the appropriate area as directed 2 (Two) Times a Day., Disp: 25 g, Rfl: 0     Past Medical History:   Diagnosis Date    Anxiety     GERD (gastroesophageal reflux disease)     Menorrhagia 2024        Past Surgical History:   Procedure Laterality Date    BREAST SURGERY      CHOLECYSTECTOMY      COLONOSCOPY      COMBINED HYSTEROSCOPY DIAGNOSTIC / D & C  2024    LABIAPLASTY  2024    SALPINGECTOMY Bilateral 2024       The additional following portions of the patient's history were reviewed and updated as appropriate: allergies, current medications, past family history, past medical history, past social history, past surgical history, and problem list.    Review of Systems   All other systems reviewed and are negative.      I have reviewed and agree with the HPI,  "ROS, and historical information as entered above. Diana Hernandez MD          Vulvar Biopsy Procedure Note  Procedures     Indications:  Marielos Moore is a 44 y.o. , who presents today for opening of skin bridge at posterior forchette with labia minora adhered.  The patient was noted to have another area more on perineum that looked similar, but as it was on perineum was thought unlikely to cause her too much discomfort.   After being presented with the risk, benefits, and specific detail of the procedure, the patient wished to proceed.  Verbal consent was obtained from patient.      Review of Systems  /72   Ht 167.6 cm (66\")   Wt 59 kg (130 lb)   BMI 20.98 kg/m²        Procedure Details     Time out: immediate members of the procedure team and patient agree to the following: correct patient, correct site, correct procedure to be performed. Diana Hernandez MD      The patient was placed in the dorsal lithotomy position and the area was prepped with betadine.   The area was injected with 2 mL of lidocaine using a 25 gauge needle.  After adequate anesthesia was reached, the skin was flattened and opened with 11 blade scalpel until at the level of the introitus.  Two 4-0 vicryl horizontal mattress sutures were placed to approximate the skin, one on each side.  A very small amount of bleeding was noted at skin edge and was rendered hemostatic with silver nitrate.  Hemostasis was adequate.       There was minimal bleeding.  The patient tolerated the procedure well. She was instructed in care and asked to followup in 1-2wk.    Pathology: none    Complications: none.              Diana Hernandez MD  2025  "

## 2025-03-04 ENCOUNTER — OFFICE VISIT (OUTPATIENT)
Dept: OBSTETRICS AND GYNECOLOGY | Facility: CLINIC | Age: 45
End: 2025-03-04
Payer: COMMERCIAL

## 2025-03-04 VITALS
SYSTOLIC BLOOD PRESSURE: 98 MMHG | HEIGHT: 66 IN | BODY MASS INDEX: 21.08 KG/M2 | DIASTOLIC BLOOD PRESSURE: 60 MMHG | WEIGHT: 131.2 LBS

## 2025-03-04 DIAGNOSIS — G89.18 POSTOPERATIVE PAIN: Primary | ICD-10-CM

## 2025-03-04 DIAGNOSIS — N90.89 VULVAR IRRITATION: ICD-10-CM

## 2025-03-04 PROCEDURE — 99024 POSTOP FOLLOW-UP VISIT: CPT | Performed by: OBSTETRICS & GYNECOLOGY

## 2025-03-04 NOTE — PROGRESS NOTES
Chief Complaint   Patient presents with    Follow-up     Vulvar biopsy       Subjective   HPI  Marielos Moore is a 44 y.o. female, . No LMP recorded. Patient has had an ablation. Who presents for follow up on Vulvar Biopsy.      Patient was seen for a vulvar biopsy on 25. Patient is c/o of some irritation where the sutures are. The patient reports additional symptoms as none.        Additional OB/GYN History     Last Pap : 10/2024 wnl  Last Completed Pap Smear       This patient has no relevant Health Maintenance data.            Last mammogram: never  Last Completed Mammogram       This patient has no relevant Health Maintenance data.            Tobacco Usage?: No   OB History          2    Para   2    Term   2            AB        Living   2         SAB        IAB        Ectopic        Molar        Multiple        Live Births   2                  Current Outpatient Medications:     clonazePAM (KlonoPIN) 1 MG tablet, Take 1 tablet by mouth At Night As Needed for Anxiety., Disp: 30 tablet, Rfl: 0    estradiol (ESTRACE VAGINAL) 0.1 MG/GM vaginal cream, Insert 1 gm intravaginally 1-3 times each week, Disp: 1 each, Rfl: 12    lidocaine (XYLOCAINE) 2 % jelly, Apply  topically to the appropriate area as directed 2 (Two) Times a Day., Disp: 28.33 g, Rfl: 0    silver sulfadiazine (SILVADENE, SSD) 1 % cream, Apply 1 Application topically to the appropriate area as directed 2 (Two) Times a Day., Disp: 25 g, Rfl: 0     Past Medical History:   Diagnosis Date    Anxiety     GERD (gastroesophageal reflux disease)     Menorrhagia 2024        Past Surgical History:   Procedure Laterality Date    BREAST SURGERY      CHOLECYSTECTOMY      COLONOSCOPY      COMBINED HYSTEROSCOPY DIAGNOSTIC / D & C  2024    LABIAPLASTY  2024    SALPINGECTOMY Bilateral 2024       The additional following portions of the patient's history were reviewed and updated as appropriate:  "allergies, current medications, past family history, past medical history, past social history, past surgical history, and problem list.    Review of Systems   Constitutional: Negative.    HENT: Negative.     Eyes: Negative.    Respiratory: Negative.     Cardiovascular: Negative.    Gastrointestinal: Negative.    Endocrine: Negative.    Genitourinary: Negative.    Musculoskeletal: Negative.    Skin: Negative.    Allergic/Immunologic: Negative.    Neurological: Negative.    Hematological: Negative.    Psychiatric/Behavioral: Negative.         I have reviewed and agree with the HPI, ROS, and historical information as entered above. Diana Hernandez MD      Objective   BP 98/60   Ht 167.6 cm (66\")   Wt 59.5 kg (131 lb 3.2 oz)   BMI 21.18 kg/m²     Physical Exam    Physical Exam:  General:  well developed; well nourished  no acute distress  mentation appropriate   Abdomen: soft, non-tender; no masses  no umbilical or inguinal hernias are present   Pelvis: Clinical staff was present for exam  External genitalia:  normal appearance of the external genitalia including Bartholin's and [place]'s glands.  :  urethral meatus normal; urethra normal:  Vaginal:  normal pink mucosa without prolapse or lesions. Vaginal now has normal caliber.  Perineum is healing well      Assessment & Plan     Assessment     Problem List Items Addressed This Visit       Vulvar irritation    Postoperative pain - Primary     Plan     Reassurance given.  Healing well.  May return to using estrace cream to perineum weekly until fully healed  Would have pelvic rest another 3wk or so.  Lots of lubrication when they do try again.  Return if symptoms worsen or fail to improve.        Diana Hernandez MD  03/04/2025   " breast and formula feeding

## 2025-03-19 ENCOUNTER — OFFICE VISIT (OUTPATIENT)
Dept: OBSTETRICS AND GYNECOLOGY | Facility: CLINIC | Age: 45
End: 2025-03-19
Payer: COMMERCIAL

## 2025-03-19 VITALS
HEIGHT: 66 IN | SYSTOLIC BLOOD PRESSURE: 90 MMHG | DIASTOLIC BLOOD PRESSURE: 60 MMHG | BODY MASS INDEX: 21.05 KG/M2 | WEIGHT: 131 LBS

## 2025-03-19 DIAGNOSIS — G89.18 POSTOPERATIVE PAIN: Primary | ICD-10-CM

## 2025-03-19 RX ORDER — CEPHALEXIN 500 MG/1
500 CAPSULE ORAL 4 TIMES DAILY
Qty: 28 CAPSULE | Refills: 0 | Status: SHIPPED | OUTPATIENT
Start: 2025-03-19 | End: 2025-03-26

## 2025-03-19 NOTE — PROGRESS NOTES
"            Chief Complaint   Patient presents with    Follow-up       Subjective   HPI  Marielos Moore is a 44 y.o. female, . Her last LMP was No LMP recorded. Patient has had an ablation.. who presents for follow up on vulvar biopsy.  She states the area is not healing and thinks \"more needs to be cut off\".    At her last visit she was treated with medication management with Estrace Cream . Since then she reports her symptoms/issue has remained unchanged. The patient reports additional symptoms as none.        Additional OB/GYN History       Last Completed Pap Smear    This patient has no relevant Health Maintenance data.           Last Completed Mammogram    This patient has no relevant Health Maintenance data.         Tobacco Usage?: No   OB History          2    Para   2    Term   2            AB        Living   2         SAB        IAB        Ectopic        Molar        Multiple        Live Births   2                  Current Outpatient Medications:     clonazePAM (KlonoPIN) 1 MG tablet, Take 1 tablet by mouth At Night As Needed for Anxiety., Disp: 30 tablet, Rfl: 0    diphenhydrAMINE (BENADRYL) 25 mg capsule, Take 1 capsule by mouth Every 6 (Six) Hours As Needed for Itching., Disp: , Rfl:     estradiol (ESTRACE VAGINAL) 0.1 MG/GM vaginal cream, Insert 1 gm intravaginally 1-3 times each week, Disp: 1 each, Rfl: 12    levocetirizine (XYZAL) 5 MG tablet, Take 1 tablet by mouth Every Evening., Disp: , Rfl:     lidocaine (XYLOCAINE) 2 % jelly, Apply  topically to the appropriate area as directed 2 (Two) Times a Day., Disp: 28.33 g, Rfl: 0    silver sulfadiazine (SILVADENE, SSD) 1 % cream, Apply 1 Application topically to the appropriate area as directed 2 (Two) Times a Day., Disp: 25 g, Rfl: 0     Past Medical History:   Diagnosis Date    Anxiety     GERD (gastroesophageal reflux disease)     Menorrhagia 2024        Past Surgical History:   Procedure Laterality Date    BREAST SURGERY " "     CHOLECYSTECTOMY      COLONOSCOPY      COMBINED HYSTEROSCOPY DIAGNOSTIC / D & C  11/21/2024    LABIAPLASTY  12/23/2024    SALPINGECTOMY Bilateral 12/23/2024       The additional following portions of the patient's history were reviewed and updated as appropriate: allergies.    Review of Systems   Constitutional: Negative.    HENT: Negative.     Eyes: Negative.    Respiratory: Negative.     Cardiovascular: Negative.    Gastrointestinal: Negative.    Genitourinary: Negative.    Musculoskeletal: Negative.    Skin: Negative.    Allergic/Immunologic: Negative.    Neurological: Negative.    Hematological: Negative.    Psychiatric/Behavioral: Negative.     All other systems reviewed and are negative.      I have reviewed and agree with the HPI, ROS, and historical information as entered above. Diana Hernandez MD      Objective   BP 90/60   Ht 167.6 cm (66\")   Wt 59.4 kg (131 lb)   BMI 21.14 kg/m²     Physical Exam  Physical Exam:  General:  well developed; well nourished  no acute distress  mentation appropriate   Abdomen: soft, non-tender; no masses  no umbilical or inguinal hernias are present   Pelvis: Clinical staff was present for exam  External genitalia:  normal appearance of the external genitalia including Bartholin's and [place]'s glands.  :  urethral meatus normal; urethra normal:  Vaginal:  normal pink mucosa without prolapse or lesions. Skin bridge opened at introitus vertically and closed horizontally with two 4-0 vicryl sutures and a smalll one in the middle  Lidocaine anesthesia.       Assessment & Plan     Assessment     Problem List Items Addressed This Visit       Postoperative pain - Primary         Plan     ~1cm opening and now closed by closing horizontally as to not narrow introitus with scar.    Return in about 1 week (around 3/26/2025) for Next scheduled follow up.        Diana Hernandez MD  03/19/2025  "

## 2025-03-26 ENCOUNTER — OFFICE VISIT (OUTPATIENT)
Dept: OBSTETRICS AND GYNECOLOGY | Facility: CLINIC | Age: 45
End: 2025-03-26
Payer: COMMERCIAL

## 2025-03-26 VITALS
BODY MASS INDEX: 21.53 KG/M2 | SYSTOLIC BLOOD PRESSURE: 100 MMHG | HEIGHT: 66 IN | DIASTOLIC BLOOD PRESSURE: 80 MMHG | WEIGHT: 134 LBS

## 2025-03-26 DIAGNOSIS — Z48.89 POSTOPERATIVE VISIT: Primary | ICD-10-CM

## 2025-03-26 PROCEDURE — 99024 POSTOP FOLLOW-UP VISIT: CPT | Performed by: OBSTETRICS & GYNECOLOGY

## 2025-03-26 RX ORDER — LEVOCETIRIZINE DIHYDROCHLORIDE 5 MG/1
5 TABLET, FILM COATED ORAL EVERY EVENING
COMMUNITY

## 2025-03-26 RX ORDER — DIPHENHYDRAMINE HCL 25 MG
25 CAPSULE ORAL EVERY 6 HOURS PRN
COMMUNITY

## 2025-03-26 NOTE — PROGRESS NOTES
Chief Complaint   Patient presents with    Follow-up       Subjective   HPI  Marielos Moore is a 44 y.o. female, .  No LMP recorded. Patient has had an ablation.. who presents for follow up on perineum incision.      At her last visit she was treated with  a small incision to open her perineum more . Since then she reports her symptoms/issue has remained unchanged. The patient would like a bigger incision done today.      Additional OB/GYN History     Last Pap : 10/2024 wnl per pt  Last Completed Pap Smear    This patient has no relevant Health Maintenance data.         Last mammogram:   Last Completed Mammogram    This patient has no relevant Health Maintenance data.         Tobacco Usage?: No   OB History          2    Para   2    Term   2            AB        Living   2         SAB        IAB        Ectopic        Molar        Multiple        Live Births   2                  Current Outpatient Medications:     clonazePAM (KlonoPIN) 1 MG tablet, Take 1 tablet by mouth At Night As Needed for Anxiety., Disp: 30 tablet, Rfl: 0    diphenhydrAMINE (BENADRYL) 25 mg capsule, Take 1 capsule by mouth Every 6 (Six) Hours As Needed for Itching., Disp: , Rfl:     estradiol (ESTRACE VAGINAL) 0.1 MG/GM vaginal cream, Insert 1 gm intravaginally 1-3 times each week, Disp: 1 each, Rfl: 12    levocetirizine (XYZAL) 5 MG tablet, Take 1 tablet by mouth Every Evening., Disp: , Rfl:     lidocaine (XYLOCAINE) 2 % jelly, Apply  topically to the appropriate area as directed 2 (Two) Times a Day., Disp: 28.33 g, Rfl: 0    silver sulfadiazine (SILVADENE, SSD) 1 % cream, Apply 1 Application topically to the appropriate area as directed 2 (Two) Times a Day., Disp: 25 g, Rfl: 0     Past Medical History:   Diagnosis Date    Anxiety     GERD (gastroesophageal reflux disease)     Menorrhagia 2024        Past Surgical History:   Procedure Laterality Date    BREAST SURGERY      CHOLECYSTECTOMY       "COLONOSCOPY      COMBINED HYSTEROSCOPY DIAGNOSTIC / D & C  11/21/2024    LABIAPLASTY  12/23/2024    SALPINGECTOMY Bilateral 12/23/2024       The additional following portions of the patient's history were reviewed and updated as appropriate: allergies, current medications, past family history, past medical history, past social history, past surgical history, and problem list.    Review of Systems   Constitutional: Negative.    HENT: Negative.     Eyes: Negative.    Respiratory: Negative.     Cardiovascular: Negative.    Gastrointestinal: Negative.    Endocrine: Negative.    Genitourinary: Negative.    Musculoskeletal: Negative.    Skin: Negative.    Allergic/Immunologic: Negative.    Neurological: Negative.    Hematological: Negative.    Psychiatric/Behavioral: Negative.         I have reviewed and agree with the HPI, ROS, and historical information as entered above. Diana Hernandez MD      Objective   /80   Ht 167.6 cm (66\")   Wt 60.8 kg (134 lb)   BMI 21.63 kg/m²     Physical Exam  Physical Exam:  General:  well developed; well nourished  no acute distress  mentation appropriate   Abdomen: soft, non-tender; no masses  no umbilical or inguinal hernias are present   Pelvis: Clinical staff was present for exam  External genitalia:  normal appearance of the external genitalia including Bartholin's and [place]'s glands.  :  urethral meatus normal; urethra normal:  Vaginal:  normal pink mucosa without prolapse or lesions. Perineum healing well.  Small bit of suture trimmed        Assessment & Plan     Assessment     Problem List Items Addressed This Visit       Postoperative visit - Primary         Plan       Continue estrace.  Healing well  Return in about 1 week (around 4/2/2025) for Next scheduled follow up.        Diana Hernandez MD  03/26/2025   "

## 2025-04-01 ENCOUNTER — OFFICE VISIT (OUTPATIENT)
Dept: OBSTETRICS AND GYNECOLOGY | Facility: CLINIC | Age: 45
End: 2025-04-01
Payer: COMMERCIAL

## 2025-04-01 VITALS
DIASTOLIC BLOOD PRESSURE: 60 MMHG | SYSTOLIC BLOOD PRESSURE: 104 MMHG | HEIGHT: 66 IN | BODY MASS INDEX: 21.24 KG/M2 | WEIGHT: 132.2 LBS

## 2025-04-01 DIAGNOSIS — G89.18 POSTOPERATIVE PAIN: Primary | ICD-10-CM

## 2025-04-01 NOTE — PROGRESS NOTES
Chief Complaint   Patient presents with    Follow-up       Subjective   HPI  Marielos Moore is a 44 y.o. female, .  No LMP recorded (lmp unknown). Patient has had an ablation.. who presents for follow up on her perineum.      At her last visit she was treated with  a small incision to her perineum . Since then she reports her symptoms/issue has remained unchanged. The patient reports she went to the gym a few days ago  and while working out, she felt one of the sutures pop. She has had some bleeding from the area and some pain and soreness especially from the left side.      Additional OB/GYN History     Last Pap : 10/2024 wnl per pt.  Last Completed Pap Smear    This patient has no relevant Health Maintenance data.         Last mammogram:   Last Completed Mammogram    This patient has no relevant Health Maintenance data.         Tobacco Usage?: No   OB History          2    Para   2    Term   2            AB        Living   2         SAB        IAB        Ectopic        Molar        Multiple        Live Births   2                  Current Outpatient Medications:     clonazePAM (KlonoPIN) 1 MG tablet, Take 1 tablet by mouth At Night As Needed for Anxiety., Disp: 30 tablet, Rfl: 0    diphenhydrAMINE (BENADRYL) 25 mg capsule, Take 1 capsule by mouth Every 6 (Six) Hours As Needed for Itching., Disp: , Rfl:     estradiol (ESTRACE VAGINAL) 0.1 MG/GM vaginal cream, Insert 1 gm intravaginally 1-3 times each week, Disp: 1 each, Rfl: 12    levocetirizine (XYZAL) 5 MG tablet, Take 1 tablet by mouth Every Evening., Disp: , Rfl:     lidocaine (XYLOCAINE) 2 % jelly, Apply  topically to the appropriate area as directed 2 (Two) Times a Day., Disp: 28.33 g, Rfl: 0    silver sulfadiazine (SILVADENE, SSD) 1 % cream, Apply 1 Application topically to the appropriate area as directed 2 (Two) Times a Day., Disp: 25 g, Rfl: 0     Past Medical History:   Diagnosis Date    Anxiety     GERD  "(gastroesophageal reflux disease)     Menorrhagia 11/21/2024        Past Surgical History:   Procedure Laterality Date    BREAST SURGERY      CHOLECYSTECTOMY      COLONOSCOPY      COMBINED HYSTEROSCOPY DIAGNOSTIC / D & C  11/21/2024    LABIAPLASTY  12/23/2024    SALPINGECTOMY Bilateral 12/23/2024       The additional following portions of the patient's history were reviewed and updated as appropriate: allergies, current medications, past family history, past medical history, past social history, past surgical history, and problem list.    Review of Systems   Constitutional: Negative.    HENT: Negative.     Eyes: Negative.    Respiratory: Negative.     Cardiovascular: Negative.    Gastrointestinal: Negative.    Endocrine: Negative.    Genitourinary: Negative.    Musculoskeletal: Negative.    Skin: Negative.    Allergic/Immunologic: Negative.    Neurological: Negative.    Hematological: Negative.    Psychiatric/Behavioral: Negative.         I have reviewed and agree with the HPI, ROS, and historical information as entered above. Diana Hernandez MD    Objective   /60   Ht 167.6 cm (66\")   Wt 60 kg (132 lb 3.2 oz)   LMP  (LMP Unknown)   BMI 21.34 kg/m²     Physical Exam    Physical Exam:  General:  well developed; well nourished  no acute distress  mentation appropriate   Abdomen: soft, non-tender; no masses  no umbilical or inguinal hernias are present   Pelvis: Clinical staff was present for exam  External genitalia:  normal appearance of the external genitalia including Bartholin's and [place]'s glands.  :  urethral meatus normal; urethra normal:  Vaginal:  normal pink mucosa without prolapse or lesions. Perineal incision healing well.        Assessment & Plan     Assessment     Problem List Items Addressed This Visit       Postoperative pain - Primary         Plan     I feel comfortable incision will continue to heal appropriately.  Still, call if concerns.  Return in about 4 weeks (around 4/29/2025), " or if symptoms worsen or fail to improve.        Diana Hernandez MD  04/01/2025

## 2025-04-29 ENCOUNTER — TELEPHONE (OUTPATIENT)
Dept: OBSTETRICS AND GYNECOLOGY | Facility: CLINIC | Age: 45
End: 2025-04-29

## 2025-04-29 ENCOUNTER — OFFICE VISIT (OUTPATIENT)
Dept: OBSTETRICS AND GYNECOLOGY | Facility: CLINIC | Age: 45
End: 2025-04-29
Payer: COMMERCIAL

## 2025-04-29 ENCOUNTER — TELEPHONE (OUTPATIENT)
Dept: OBSTETRICS AND GYNECOLOGY | Facility: CLINIC | Age: 45
End: 2025-04-29
Payer: COMMERCIAL

## 2025-04-29 VITALS
HEIGHT: 66 IN | DIASTOLIC BLOOD PRESSURE: 68 MMHG | BODY MASS INDEX: 21.69 KG/M2 | SYSTOLIC BLOOD PRESSURE: 100 MMHG | WEIGHT: 135 LBS

## 2025-04-29 DIAGNOSIS — N94.10 FEMALE DYSPAREUNIA: ICD-10-CM

## 2025-04-29 DIAGNOSIS — R10.2 VULVAR PAIN: Primary | ICD-10-CM

## 2025-04-29 RX ORDER — DIAZEPAM 5 MG/1
TABLET ORAL
Qty: 14 TABLET | Refills: 0 | Status: SHIPPED | OUTPATIENT
Start: 2025-04-29

## 2025-04-29 RX ORDER — ESTRADIOL 0.1 MG/G
CREAM VAGINAL
Qty: 1 EACH | Refills: 12 | Status: SHIPPED | OUTPATIENT
Start: 2025-04-29

## 2025-04-29 NOTE — PROGRESS NOTES
"            Chief Complaint   Patient presents with    Follow-up     Vulvar f/u       Subjective   HPI  Marielos Moore is a 44 y.o. female, . Her last LMP was No LMP recorded (lmp unknown). Patient has had an ablation.. who presents for follow up on vulvar pain and bleeding. She states that she can feel tearing after intercourse and some bleeding. She also states that she feels like \" everything is falling out\" with a bowel movement.       Original surgery was in December.  Her in office revision was  and 3/19.    At her last visit she was treated with expectant management. Since then she reports her symptoms/issue has remained unchanged. The patient reports additional symptoms as none.        Additional OB/GYN History       Last Completed Pap Smear    This patient has no relevant Health Maintenance data.           Last Completed Mammogram    This patient has no relevant Health Maintenance data.         Tobacco Usage?: No   OB History          2    Para   2    Term   2            AB        Living   2         SAB        IAB        Ectopic        Molar        Multiple        Live Births   2                  Current Outpatient Medications:     estradiol (ESTRACE VAGINAL) 0.1 MG/GM vaginal cream, Insert 1 gm intravaginally 2 times each week for 3 weeks and then weekly thereafter., Disp: 1 each, Rfl: 12    clonazePAM (KlonoPIN) 1 MG tablet, Take 1 tablet by mouth At Night As Needed for Anxiety., Disp: 30 tablet, Rfl: 0    diazePAM (Valium) 5 MG tablet, Insert 1 tablet PV as high as possible nightly for 14 nights., Disp: 14 tablet, Rfl: 0    diphenhydrAMINE (BENADRYL) 25 mg capsule, Take 1 capsule by mouth Every 6 (Six) Hours As Needed for Itching., Disp: , Rfl:     levocetirizine (XYZAL) 5 MG tablet, Take 1 tablet by mouth Every Evening., Disp: , Rfl:     lidocaine (XYLOCAINE) 2 % jelly, Apply  topically to the appropriate area as directed 2 (Two) Times a Day., Disp: 28.33 g, Rfl: 0    silver " "sulfadiazine (SILVADENE, SSD) 1 % cream, Apply 1 Application topically to the appropriate area as directed 2 (Two) Times a Day., Disp: 25 g, Rfl: 0     Past Medical History:   Diagnosis Date    Anxiety     GERD (gastroesophageal reflux disease)     Menorrhagia 11/21/2024        Past Surgical History:   Procedure Laterality Date    BREAST SURGERY      CHOLECYSTECTOMY      COLONOSCOPY      COMBINED HYSTEROSCOPY DIAGNOSTIC / D & C  11/21/2024    LABIAPLASTY  12/23/2024    SALPINGECTOMY Bilateral 12/23/2024       The additional following portions of the patient's history were reviewed and updated as appropriate: allergies.    Review of Systems   Genitourinary:  Positive for vaginal pain.   All other systems reviewed and are negative.      I have reviewed and agree with the HPI, ROS, and historical information as entered above. Diana Hernandez MD      Objective   /68   Ht 167.6 cm (66\")   Wt 61.2 kg (135 lb)   LMP  (LMP Unknown)   BMI 21.79 kg/m²     Physical Exam    Physical Exam:  General:  well developed; well nourished  no acute distress  mentation appropriate   Abdomen: soft, non-tender; no masses  no umbilical or inguinal hernias are present   Pelvis: Clinical staff was present for exam  External genitalia:  normal appearance of the external genitalia including Bartholin's and [place]'s glands.  :  urethral meatus normal; urethra normal:  Vaginal:  normal pink mucosa without prolapse or lesions. caliber of the introitus is normal, unrestricted; there is <1 cm abrasion of skin at posterior forchette.  No rectocele is present, no significant prolapse noted.  Perineum is quite short        Assessment & Plan     Assessment     Problem List Items Addressed This Visit       Female dyspareunia    Relevant Medications    diazePAM (Valium) 5 MG tablet    Other Relevant Orders    Ambulatory Referral to Physical Therapy for Evaluation & Treatment    Vulvar pain - Primary    Relevant Medications    diazePAM " (Valium) 5 MG tablet         Plan     Discussed at this point, I would recommend against any further revisions.  Would restart estrogen to help with elasticity, vaginal valium in case component of vaginismus.    Recommend pelvic floor physical therapy.  Return in about 6 weeks (around 6/10/2025).        Diana Hernandez MD  04/29/2025

## 2025-04-29 NOTE — TELEPHONE ENCOUNTER
Spoke with pt about starting vaginal valium tablets vs suppositories. She will try the tablets first and if she has no relief, she will call ofc to try the supp.     [facility] can make valium supp 5mg for about $3 each. If she calls back and wants these, we need to see if Lidocaine can be added and does it change the cost.

## 2025-05-08 ENCOUNTER — HOSPITAL ENCOUNTER (OUTPATIENT)
Dept: PHYSICAL THERAPY | Facility: HOSPITAL | Age: 45
Setting detail: THERAPIES SERIES
Discharge: HOME | End: 2025-05-08
Payer: COMMERCIAL

## 2025-05-08 DIAGNOSIS — R10.2 VULVAR PAIN: ICD-10-CM

## 2025-05-08 DIAGNOSIS — M62.89 PELVIC FLOOR DYSFUNCTION: Primary | ICD-10-CM

## 2025-05-08 DIAGNOSIS — M62.89 PELVIC FLOOR TENSION: ICD-10-CM

## 2025-05-08 DIAGNOSIS — R19.8 PAIN WITH BOWEL MOVEMENTS: ICD-10-CM

## 2025-05-08 PROCEDURE — 97163 PT EVAL HIGH COMPLEX 45 MIN: CPT

## 2025-05-08 PROCEDURE — 97530 THERAPEUTIC ACTIVITIES: CPT

## 2025-05-08 NOTE — THERAPY EVALUATION
"Physical Therapy Pelvic Initial Evaluation     Patient: Marielos Moore   : 1980  Diagnosis/ICD-10 Code:      ICD-10-CM ICD-9-CM   1. Pelvic floor dysfunction  M62.89 618.83   2. Vulvar pain  R10.2 625.9   3. Pelvic floor tension  M62.89 597.81   4. 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 She has had two revisions since by Dr Hernandez          Chief Complaint:   Chief Complaint   Patient presents with    Initial Evaluation      Functional Outcome Measure:  PFDI score    Pain  Best: Pelvic #: 1 Worst: Pelvic #: 10  Location: vulva and perineum  Aggs: intercourse, penetration, bowel movements  Abdominal or pelvic pressure: a lot of pressure  Lumbar/hip pain: na    Sexual Dysfunction  Pain with initial penetration: severe  Pain with deep penetration: severe  Pressure/pain after sex: yes  Positions of comfort & discomfort: no      Bladder function:  Frequency of urination: 8  Incontinence: no  Leak at night: no  Urination at night: somteims  Urinary urge: some  Urge triggers: na  Complete bladder voiding %: no  Post-micturition dribble: no  Discomfort with urination: no    Bowel function:  How many BM's/day: 1-3week  Discomfort with BM: yes  Complete bowel voiding %: no  Fecal urgency: strong sometimes  Assistance to pass stool: na  How many episodes of leakage/month: no  Incontinence with gas: no  Water/Fiber: excessive fiber  [place] Stool Scale Type: 4 when able to empty      Diagnostics:    PMH:   Past Medical History:   Diagnosis Date    Anxiety     GERD (gastroesophageal reflux disease)     Menorrhagia 2024        Surgical HX:   Past Surgical History:   Procedure Laterality Date    BREAST " SURGERY      CHOLECYSTECTOMY      COLONOSCOPY      COMBINED HYSTEROSCOPY DIAGNOSTIC / D & C  11/21/2024    LABIAPLASTY  12/23/2024    SALPINGECTOMY Bilateral 12/23/2024        Cycle History: tubes removed    Birth HX 2 vaginal births    Occupation/hobbies: did not report    Objective    verbal consent obtained for internal pelvic exam/treatment with declined need for second person in room    Palpation: TTTP all pelvic floor muscles, vulva and perineum severely TTTP    PF Muscle Coordination/Pressure Management: downward movement of anterior wall with cough    PERF SCALE:  Power: 1+    Endurance: DNT d/t pain    Repetitions: DNT d/t pain    Fast twitch: DNT d/t pain    See flowsheet for details of treatment following evaluation.     05/08/25 1600   Total Minutes   54703 - PT Therapeutic Activity Minutes 10   Exercise 1   Exercise Name 1 see scanned in chart: scar massage, dialator program, bowel regimen, stool for toilet positioning       Assessment/Plan:     Assessment/Plan    The patient is a 44 y.o. female who presents to physical therapy today with unstable symptoms of a high severity regarding these problems vuvlar and perineal pain, pelvic floor muscle dysfunction, and pain with bowel movements. Upon initial evaluation, the patient demonstrates the following impairments: lack of HEP, lack of pelvic floor education, decreased pelvic floor coordination, decreased pelvic floor strength, significant pain with all palpation of pelvic floor, and decreased vulva/perineal scar tissue mobility. Due to the severity of these impairments, the patient is unable to have intercourse or have effective bowel movements. The patient would benefit from skilled pelvic PT services to address impairments and achieve highest level of functioning in order to improve quality of life, activity level, and community participation. Due to the severity of impairments, medical complexity, and recent surgical complications she requires 2 PT  visits per week over the course of 10 visits for optimal recovery.    Goals:     STG's: 3 weeks  Patient will report 50% reduction in pain during scar tissue massage for improved QOL and activity participation  2. Patient will be able to perform HEP with minimal verbal cues, for improved exercise participation and optimal recovery  3. Patient will report 50% reduction in pain during intercourse, for improved QOL and activity participation  4. Patient will demo 50% reduction in painful points in pelvic floor muscle palpation, for improved activity tolerance and optimal recovery  5. Patient will have 50% less pain during bowel movements to improve QOL   6. Patient to experience 50% improved bowel emptying to decrease pressure on the pelvic floor muscles    LTG's: 6 weeks  Patient will report no pain during scar tissue massage for improved QOL and activity participation  Patient will be independent with HEP for improved exercise participation and decreased risk of re-injury  Patient will report no pain during intercourse, for improved QOL and activity participation  4.  Patient will have no painful points in pelvic floor muscle palpation, for improved activity tolerance and optimal recovery  5. Patient will have 100% less pain during bowel movements to improve QOL   6. Patient to experience 100% improved bowel emptying to decrease pressure on the pelvic floor muscles  7. Patient to have adequate length/tension movement of PF muscles during PF examination for improved strength and QOL      Plan  Therapy options: will be seen for skilled physical therapy services  Planned modality interventions: TENS, ultrasound, cryotherapy, thermotherapy (hydrocollator packs) and high voltage pulsed current (pain management)  Planned therapy interventions: abdominal trunk stabilization, manual therapy, neuromuscular re-education, body mechanics training, flexibility, functional ROM exercises, gait training, home exercise program, joint  mobilization, therapeutic activities, stretching, strengthening, spinal/joint mobilization, soft tissue mobilization and postural training. CPT codes to include: 07716, 93838, 50145, 42289, 76711, and 47113.  Frequency: 2 x per week  Visits:  10  Treatment plan discussed with: patient    Therapy Charges for Today       Code Description Service Date Service Provider Modifiers Qty    15528491905 HC PT THERAPEUTIC ACT EA 15 MIN 5/8/2025 Juliette Atwood, PT GP 1    41120463102 HC PT EVAL HIGH COMPLEXITY 3 5/8/2025 Juliette Atwood, PT GP 1             PT SIGNATURE: Juliette Atwood PT, DPT  [place] License # [id number]    DATE TREATMENT INITIATED: 5/8/2025    Initial Certification  Certification Period: 8/6/2025  I certify that the therapy services are furnished while this patient is under my care.  The services outlined above are required by this patient, and will be reviewed every 90 days.     PHYSICIAN: Diana Hernandez MD      DATE: 05/08/2025     Please sign and return via fax to [phone]. Thank you, [facility].

## 2025-05-12 ENCOUNTER — HOSPITAL ENCOUNTER (OUTPATIENT)
Dept: PHYSICAL THERAPY | Facility: HOSPITAL | Age: 45
Setting detail: THERAPIES SERIES
Discharge: HOME | End: 2025-05-12
Payer: COMMERCIAL

## 2025-05-12 DIAGNOSIS — R19.8 PAIN WITH BOWEL MOVEMENTS: ICD-10-CM

## 2025-05-12 DIAGNOSIS — M62.89 PELVIC FLOOR DYSFUNCTION: Primary | ICD-10-CM

## 2025-05-12 DIAGNOSIS — R10.2 VULVAR PAIN: ICD-10-CM

## 2025-05-12 DIAGNOSIS — M62.89 PELVIC FLOOR TENSION: ICD-10-CM

## 2025-05-12 PROCEDURE — 97140 MANUAL THERAPY 1/> REGIONS: CPT

## 2025-05-12 NOTE — THERAPY TREATMENT NOTE
Physical Therapy Daily Note      Patient: Marielos Moore   : 1980  MRN: [id number]   Diagnosis/ICD-10 Code:      ICD-10-CM ICD-9-CM   1. Pelvic floor dysfunction  M62.89 618.83   2. Vulvar pain  R10.2 625.9   3. Pelvic floor tension  M62.89 597.81   4. Pain with bowel movements  R19.8 564.00      Referring practitioner: Diana Hernandez*  Today's Date: 2025    Subjective:   Patient reports: trialed scar massage, continues to be painful, she is unsure if she is relaxaing  Pain: did not rate  Treatment has included: manual therapy      Subjective      Objective   See Exercise, Manual, and Modality Logs for complete treatment.     25 1500   Total Minutes   53038 - PT Manual Therapy Minutes 40   Manual Rx 1   Manual Rx 1 Location pernium scar massage w/3 single points of pressure   Manual Rx 1 Grade 1   Manual Rx 1 Duration prior to internal muscle release 5/10, 7/10, and 6/10 going R to L. Post internal muscle release centerfuge of scar /10   Manual Rx 2   Manual Rx 2 Location internal muscle release to all of B intermediate layers, each spot requiring 1-3 minutes to release   Manual Rx 2 Grade 1-2   Manual Rx 3   Manual Rx 3 Location ed on dialator program: 1st step PF relaxation, 2nd step dialator, 3rd step gentle scar massage, 4th step PF relaxation breathing. May do PF relxation breathing prior to intercourse. She will notice if her pain is ramping up/increasing and cease activity  (see handouts)     PT Pelvic          Assessment & Plan       Assessment  Assessment details: Session focused on manual therapy and education about dialator program and scar tissue massage. Main point of scar tissue decreased from /10 to 6/10 after manual PF muscle release. Patient would continue to benefit from skilled PT services to achieve highest level of functioning.      Plan  Plan details: Patient to continue with PT services to improve PF coordination, PF strength, core strength, decrease pain and  improve bowel/bladder function.            Therapy Charges for Today       Code Description Service Date Service Provider Modifiers Qty    54308944866 HC PT MANUAL THERAPY EA 15 MIN 5/12/2025 Juliette Atwood, PT GP 3          PT SIGNATURE: Juliette Atwood PT, DPT  [place] License # [id number]  Physical Therapist

## 2025-05-15 ENCOUNTER — HOSPITAL ENCOUNTER (OUTPATIENT)
Dept: PHYSICAL THERAPY | Facility: HOSPITAL | Age: 45
Setting detail: THERAPIES SERIES
Discharge: HOME | End: 2025-05-15
Payer: COMMERCIAL

## 2025-05-15 ENCOUNTER — HOSPITAL ENCOUNTER (EMERGENCY)
Facility: HOSPITAL | Age: 45
Discharge: HOME | End: 2025-05-15
Attending: EMERGENCY MEDICINE
Payer: COMMERCIAL

## 2025-05-15 ENCOUNTER — APPOINTMENT (OUTPATIENT)
Dept: CARDIOLOGY | Facility: HOSPITAL | Age: 45
End: 2025-05-15
Payer: COMMERCIAL

## 2025-05-15 ENCOUNTER — APPOINTMENT (OUTPATIENT)
Dept: CT IMAGING | Facility: HOSPITAL | Age: 45
End: 2025-05-15
Payer: COMMERCIAL

## 2025-05-15 VITALS
OXYGEN SATURATION: 100 % | HEART RATE: 64 BPM | DIASTOLIC BLOOD PRESSURE: 83 MMHG | HEIGHT: 66 IN | SYSTOLIC BLOOD PRESSURE: 125 MMHG | RESPIRATION RATE: 16 BRPM | TEMPERATURE: 96.9 F | WEIGHT: 135 LBS | BODY MASS INDEX: 21.69 KG/M2

## 2025-05-15 DIAGNOSIS — R10.2 VULVAR PAIN: ICD-10-CM

## 2025-05-15 DIAGNOSIS — R10.84 GENERALIZED ABDOMINAL PAIN: Primary | ICD-10-CM

## 2025-05-15 DIAGNOSIS — M62.89 PELVIC FLOOR TENSION: ICD-10-CM

## 2025-05-15 DIAGNOSIS — M79.605 PAIN OF LEFT LOWER EXTREMITY: ICD-10-CM

## 2025-05-15 DIAGNOSIS — M54.32 SCIATICA OF LEFT SIDE: ICD-10-CM

## 2025-05-15 DIAGNOSIS — M62.89 PELVIC FLOOR DYSFUNCTION: Primary | ICD-10-CM

## 2025-05-15 DIAGNOSIS — R19.8 PAIN WITH BOWEL MOVEMENTS: ICD-10-CM

## 2025-05-15 LAB
ALBUMIN SERPL-MCNC: 4.3 G/DL (ref 3.5–5.2)
ALBUMIN/GLOB SERPL: 1.9 G/DL
ALP SERPL-CCNC: 70 U/L (ref 39–117)
ALT SERPL W P-5'-P-CCNC: 16 U/L (ref 1–33)
ANION GAP SERPL CALCULATED.3IONS-SCNC: 10 MMOL/L (ref 5–15)
AST SERPL-CCNC: 18 U/L (ref 1–32)
BACTERIA UR QL AUTO: ABNORMAL /HPF
BASOPHILS # BLD AUTO: 0.04 10*3/MM3 (ref 0–0.2)
BASOPHILS NFR BLD AUTO: 0.8 % (ref 0–1.5)
BH CV LOWER VASCULAR LEFT COMMON FEMORAL AUGMENT: NORMAL
BH CV LOWER VASCULAR LEFT COMMON FEMORAL COMPRESS: NORMAL
BH CV LOWER VASCULAR LEFT COMMON FEMORAL PHASIC: NORMAL
BH CV LOWER VASCULAR LEFT COMMON FEMORAL SPONT: NORMAL
BH CV LOWER VASCULAR LEFT DISTAL FEMORAL AUGMENT: NORMAL
BH CV LOWER VASCULAR LEFT DISTAL FEMORAL COMPRESS: NORMAL
BH CV LOWER VASCULAR LEFT DISTAL FEMORAL PHASIC: NORMAL
BH CV LOWER VASCULAR LEFT DISTAL FEMORAL SPONT: NORMAL
BH CV LOWER VASCULAR LEFT GASTRONEMIUS COMPRESS: NORMAL
BH CV LOWER VASCULAR LEFT GREATER SAPH AK COMPRESS: NORMAL
BH CV LOWER VASCULAR LEFT GREATER SAPH BK COMPRESS: NORMAL
BH CV LOWER VASCULAR LEFT LESSER SAPH COMPRESS: NORMAL
BH CV LOWER VASCULAR LEFT MID FEMORAL AUGMENT: NORMAL
BH CV LOWER VASCULAR LEFT MID FEMORAL COMPRESS: NORMAL
BH CV LOWER VASCULAR LEFT MID FEMORAL PHASIC: NORMAL
BH CV LOWER VASCULAR LEFT MID FEMORAL SPONT: NORMAL
BH CV LOWER VASCULAR LEFT PERONEAL COMPRESS: NORMAL
BH CV LOWER VASCULAR LEFT POPLITEAL AUGMENT: NORMAL
BH CV LOWER VASCULAR LEFT POPLITEAL COMPRESS: NORMAL
BH CV LOWER VASCULAR LEFT POPLITEAL PHASIC: NORMAL
BH CV LOWER VASCULAR LEFT POPLITEAL SPONT: NORMAL
BH CV LOWER VASCULAR LEFT POSTERIOR TIBIAL COMPRESS: NORMAL
BH CV LOWER VASCULAR LEFT PROFUNDA FEMORAL COMPRESS: NORMAL
BH CV LOWER VASCULAR LEFT PROXIMAL FEMORAL AUGMENT: NORMAL
BH CV LOWER VASCULAR LEFT PROXIMAL FEMORAL COMPRESS: NORMAL
BH CV LOWER VASCULAR LEFT PROXIMAL FEMORAL PHASIC: NORMAL
BH CV LOWER VASCULAR LEFT PROXIMAL FEMORAL SPONT: NORMAL
BH CV LOWER VASCULAR LEFT SAPHENOFEMORAL JUNCTION AUGMENT: NORMAL
BH CV LOWER VASCULAR LEFT SAPHENOFEMORAL JUNCTION COMPRESS: NORMAL
BH CV LOWER VASCULAR LEFT SAPHENOFEMORAL JUNCTION PHASIC: NORMAL
BH CV LOWER VASCULAR LEFT SAPHENOFEMORAL JUNCTION SPONT: NORMAL
BH CV LOWER VASCULAR RIGHT COMMON FEMORAL AUGMENT: NORMAL
BH CV LOWER VASCULAR RIGHT COMMON FEMORAL PHASIC: NORMAL
BH CV LOWER VASCULAR RIGHT COMMON FEMORAL SPONT: NORMAL
BH CV VAS PRELIMINARY FINDINGS SCRIPTING: 1
BILIRUB SERPL-MCNC: 0.4 MG/DL (ref 0–1.2)
BILIRUB UR QL STRIP: NEGATIVE
BUN SERPL-MCNC: 12 MG/DL (ref 6–20)
BUN/CREAT SERPL: 16.4 (ref 7–25)
CALCIUM SPEC-SCNC: 9.1 MG/DL (ref 8.6–10.5)
CHLORIDE SERPL-SCNC: 103 MMOL/L (ref 98–107)
CLARITY UR: ABNORMAL
CO2 SERPL-SCNC: 24 MMOL/L (ref 22–29)
COLOR UR: YELLOW
CREAT SERPL-MCNC: 0.73 MG/DL (ref 0.57–1)
DEPRECATED RDW RBC AUTO: 41.2 FL (ref 37–54)
EGFRCR SERPLBLD CKD-EPI 2021: 104.8 ML/MIN/1.73
EOSINOPHIL # BLD AUTO: 0.04 10*3/MM3 (ref 0–0.4)
EOSINOPHIL NFR BLD AUTO: 0.8 % (ref 0.3–6.2)
ERYTHROCYTE [DISTWIDTH] IN BLOOD BY AUTOMATED COUNT: 11.9 % (ref 12.3–15.4)
GLOBULIN UR ELPH-MCNC: 2.3 GM/DL
GLUCOSE SERPL-MCNC: 105 MG/DL (ref 65–99)
GLUCOSE UR STRIP-MCNC: NEGATIVE MG/DL
HCT VFR BLD AUTO: 39.3 % (ref 34–46.6)
HGB BLD-MCNC: 13.5 G/DL (ref 12–15.9)
HGB UR QL STRIP.AUTO: NEGATIVE
HYALINE CASTS UR QL AUTO: ABNORMAL /LPF
IMM GRANULOCYTES # BLD AUTO: 0.02 10*3/MM3 (ref 0–0.05)
IMM GRANULOCYTES NFR BLD AUTO: 0.4 % (ref 0–0.5)
KETONES UR QL STRIP: NEGATIVE
LEUKOCYTE ESTERASE UR QL STRIP.AUTO: NEGATIVE
LIPASE SERPL-CCNC: 18 U/L (ref 13–60)
LYMPHOCYTES # BLD AUTO: 1.43 10*3/MM3 (ref 0.7–3.1)
LYMPHOCYTES NFR BLD AUTO: 26.9 % (ref 19.6–45.3)
MCH RBC QN AUTO: 32.5 PG (ref 26.6–33)
MCHC RBC AUTO-ENTMCNC: 34.4 G/DL (ref 31.5–35.7)
MCV RBC AUTO: 94.5 FL (ref 79–97)
MONOCYTES # BLD AUTO: 0.67 10*3/MM3 (ref 0.1–0.9)
MONOCYTES NFR BLD AUTO: 12.6 % (ref 5–12)
NEUTROPHILS NFR BLD AUTO: 3.12 10*3/MM3 (ref 1.7–7)
NEUTROPHILS NFR BLD AUTO: 58.5 % (ref 42.7–76)
NITRITE UR QL STRIP: NEGATIVE
NRBC BLD AUTO-RTO: 0 /100 WBC (ref 0–0.2)
PH UR STRIP.AUTO: 8.5 [PH] (ref 5–8)
PLATELET # BLD AUTO: 166 10*3/MM3 (ref 140–450)
PMV BLD AUTO: 10.5 FL (ref 6–12)
POTASSIUM SERPL-SCNC: 3.5 MMOL/L (ref 3.5–5.2)
PROT SERPL-MCNC: 6.6 G/DL (ref 6–8.5)
PROT UR QL STRIP: NEGATIVE
QT INTERVAL: 434 MS
QTC INTERVAL: 429 MS
RBC # BLD AUTO: 4.16 10*6/MM3 (ref 3.77–5.28)
RBC # UR STRIP: ABNORMAL /HPF
REF LAB TEST METHOD: ABNORMAL
SODIUM SERPL-SCNC: 137 MMOL/L (ref 136–145)
SP GR UR STRIP: 1.01 (ref 1–1.03)
SQUAMOUS #/AREA URNS HPF: ABNORMAL /HPF
TROPONIN T SERPL HS-MCNC: <6 NG/L
UROBILINOGEN UR QL STRIP: ABNORMAL
WBC # UR STRIP: ABNORMAL /HPF
WBC NRBC COR # BLD AUTO: 5.32 10*3/MM3 (ref 3.4–10.8)

## 2025-05-15 PROCEDURE — 84484 ASSAY OF TROPONIN QUANT: CPT | Performed by: NURSE PRACTITIONER

## 2025-05-15 PROCEDURE — 83690 ASSAY OF LIPASE: CPT | Performed by: NURSE PRACTITIONER

## 2025-05-15 PROCEDURE — 81001 URINALYSIS AUTO W/SCOPE: CPT | Performed by: NURSE PRACTITIONER

## 2025-05-15 PROCEDURE — 93971 EXTREMITY STUDY: CPT

## 2025-05-15 PROCEDURE — 75635 CT ANGIO ABDOMINAL ARTERIES: CPT

## 2025-05-15 PROCEDURE — 99285 EMERGENCY DEPT VISIT HI MDM: CPT

## 2025-05-15 PROCEDURE — 93005 ELECTROCARDIOGRAM TRACING: CPT | Performed by: NURSE PRACTITIONER

## 2025-05-15 PROCEDURE — 25810000003 SODIUM CHLORIDE 0.9 % SOLUTION: Performed by: NURSE PRACTITIONER

## 2025-05-15 PROCEDURE — 93971 EXTREMITY STUDY: CPT | Performed by: INTERNAL MEDICINE

## 2025-05-15 PROCEDURE — 25510000001 IOPAMIDOL PER 1 ML: Performed by: EMERGENCY MEDICINE

## 2025-05-15 PROCEDURE — 97140 MANUAL THERAPY 1/> REGIONS: CPT

## 2025-05-15 PROCEDURE — 80053 COMPREHEN METABOLIC PANEL: CPT | Performed by: NURSE PRACTITIONER

## 2025-05-15 PROCEDURE — 85025 COMPLETE CBC W/AUTO DIFF WBC: CPT | Performed by: NURSE PRACTITIONER

## 2025-05-15 RX ORDER — METHOCARBAMOL 750 MG/1
750 TABLET, FILM COATED ORAL 3 TIMES DAILY
Qty: 21 TABLET | Refills: 0 | Status: SHIPPED | OUTPATIENT
Start: 2025-05-15 | End: 2025-05-22

## 2025-05-15 RX ORDER — IOPAMIDOL 755 MG/ML
100 INJECTION, SOLUTION INTRAVASCULAR
Status: COMPLETED | OUTPATIENT
Start: 2025-05-15 | End: 2025-05-15

## 2025-05-15 RX ORDER — NAPROXEN 500 MG/1
500 TABLET ORAL 2 TIMES DAILY PRN
Qty: 20 TABLET | Refills: 0 | Status: SHIPPED | OUTPATIENT
Start: 2025-05-15

## 2025-05-15 RX ORDER — SODIUM CHLORIDE 0.9 % (FLUSH) 0.9 %
10 SYRINGE (ML) INJECTION AS NEEDED
Status: DISCONTINUED | OUTPATIENT
Start: 2025-05-15 | End: 2025-05-15 | Stop reason: HOSPADM

## 2025-05-15 RX ADMIN — SODIUM CHLORIDE 1000 ML: 9 INJECTION, SOLUTION INTRAVENOUS at 12:23

## 2025-05-15 RX ADMIN — IOPAMIDOL 125 ML: 755 INJECTION, SOLUTION INTRAVENOUS at 12:23

## 2025-05-15 NOTE — DISCHARGE INSTRUCTIONS
Imaging shows no evidence of an abdominal aortic aneurysm or dissection.    Imaging shows no evidence of any acute abnormality in your abdomen.    Your left lower extremity duplex is negative for DVT.    Follow-up with primary care as needed.

## 2025-05-15 NOTE — THERAPY TREATMENT NOTE
Physical Therapy Daily Note      Patient: Marielos Moore   : 1980  MRN: [id number]   Diagnosis/ICD-10 Code:      ICD-10-CM ICD-9-CM   1. Pelvic floor dysfunction  M62.89 618.83   2. Vulvar pain  R10.2 625.9   3. Pelvic floor tension  M62.89 597.81   4. Pain with bowel movements  R19.8 564.00      Referring practitioner: Diana Hernandez*  Today's Date: 5/15/2025    Subjective:   Patient reports: BM- feeling bloated, had diarrhea and incomplete bowel movement   scar massage- performed turning technique w/finger is going okay. She had significant back pain radiating down LLE this weekend and almost went to the ER for the pain  Pain: did not rate  Treatment has included: neuromuscular re-education and manual therapy      Subjective      Objective   See Exercise, Manual, and Modality Logs for complete treatment.     05/15/25 0900   Total Minutes   74175 - PT Manual Therapy Minutes 53   Manual Rx 1   Manual Rx 1 Location pernium scar massage w/3 single points of pressure   Manual Rx 1 Grade 1   Manual Rx 2   Manual Rx 2 Location internal muscle release to all of B intermediate layers, each spot requiring 1-3 minutes to release   Manual Rx 2 Grade 1-2   Manual Rx 3   Manual Rx 3 Location ed on intercourse strategies       At end of session- Pt had large amplitude abdominal throbbing to palpation at midline about 1.5 inches above umbilicus, palpation was tender and painful. Sent Pt to [facility] ER for further medical workup, called ahead with her permission to give ER nurse update on findings.    PT Pelvic          Assessment & Plan       Assessment  Assessment details: Pt had large amplitude abdominal throbbing to palpation at midline about 1.5 inches above umbilicus, palpation was tender and painful. Her history of straining with bowel movements and back pain flare over the weekend concerning for abdominal artery involvement. Sent Pt to [facility] ER for further medical workup, called ahead  with her permission to give ER nurse update on findings. Patient would continue to benefit from skilled PT services to achieve highest level of functioning.      Plan  Plan details: Patient to continue with PT services to improve PF coordination, PF strength, core strength, decrease pain and improve bowel/bladder function.            Therapy Charges for Today       Code Description Service Date Service Provider Modifiers Qty    73044317933 HC PT MANUAL THERAPY EA 15 MIN 5/15/2025 Juliette Atwood, MARLI GP 4          PT SIGNATURE: Juliette Atwood PT, DPT  [place] License # [id number]  Physical Therapist

## 2025-05-15 NOTE — ED PROVIDER NOTES
EMERGENCY DEPARTMENT ENCOUNTER    Pt Name: Marielos Moore  MRN: [id number]  Pt :   1980  Room Number:    Date of encounter:  5/15/2025  PCP: Stephen Brooks MD  ED Provider: EDITH Apodaca    Historian: Patient    HPI:  Chief Complaint:  abd pain, Lt leg pain    Context: Marielos Moore is a 44 y.o. female who presents to the ED c/o abd pain and Lt leg pain.  Patient reports that she has had abdominal discomfort over the past couple weeks.  She complains of abdominal pain, bloating.  She reports nausea but denies vomiting.  Patient did have diarrhea 1 day ago.  She explains that she recently started taking magnesium a week ago and is concerned that this is causing her symptoms.  She denies any urinary frequency, burning, urgency.  Patient has had some discomfort in her left lower extremity.  She reports that it starts in her left posterior hip radiates into her groin and down her leg.  She denies any history of DVT, PE.  However she is concerned this could be a blood clot.  She also thought it could possibly be sciatica.  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    Today patient went to physical therapy.  She is going to PT for her pelvic floor.  Patient explains that the PT was advised of all the patient's symptoms.  She was pressing on her abdomen and thought she felt a pulsatile mass.  Patient denies any history of AAA.  Patient denies chest pain, shortness of breath.  HPI     REVIEW OF SYSTEMS  A chief complaint appropriate review of systems was completed and is negative except as noted in the HPI.     PAST MEDICAL HISTORY  Past Medical History:   Diagnosis Date    Anxiety     GERD (gastroesophageal reflux disease)     Menorrhagia 2024       PAST SURGICAL HISTORY  Past Surgical History:   Procedure Laterality Date    BREAST SURGERY      CHOLECYSTECTOMY      COLONOSCOPY      COMBINED HYSTEROSCOPY DIAGNOSTIC / D & C  2024     LABIAPLASTY  12/23/2024    SALPINGECTOMY Bilateral 12/23/2024       FAMILY HISTORY  Family History   Problem Relation Age of Onset    Alcohol abuse Mother     Drug abuse Mother     Alcohol abuse Father        SOCIAL HISTORY  Social History     Socioeconomic History    Marital status: Single    Number of children: 2   Tobacco Use    Smoking status: Never    Smokeless tobacco: Never   Vaping Use    Vaping status: Never Used   Substance and Sexual Activity    Alcohol use: Yes     Comment: rare    Drug use: Never    Sexual activity: Not Currently     Birth control/protection: Tubal ligation       ALLERGIES  Sertraline and Tetracycline    PHYSICAL EXAM  Physical Exam  Vitals and nursing note reviewed.   Constitutional:       General: She is not in acute distress.     Appearance: She is well-developed. She is not ill-appearing or toxic-appearing.   HENT:      Head: Normocephalic and atraumatic.      Mouth/Throat:      Mouth: Mucous membranes are moist.   Eyes:      General: No scleral icterus.     Extraocular Movements: Extraocular movements intact.      Pupils: Pupils are equal, round, and reactive to light.   Cardiovascular:      Rate and Rhythm: Normal rate and regular rhythm.      Heart sounds: Normal heart sounds.   Pulmonary:      Effort: Pulmonary effort is normal. No respiratory distress.      Breath sounds: Normal breath sounds.   Abdominal:      General: Bowel sounds are normal.      Palpations: Abdomen is soft.      Tenderness: There is generalized abdominal tenderness.      Comments: No pulsatile mass on exam.    Skin:     General: Skin is warm and dry.   Neurological:      General: No focal deficit present.      Mental Status: She is alert and oriented to person, place, and time.   Psychiatric:         Mood and Affect: Mood normal. Mood is not anxious.         Behavior: Behavior normal.           LAB RESULTS  Results for orders placed or performed during the hospital encounter of 05/15/25   Comprehensive  Metabolic Panel    Collection Time: 05/15/25 10:47 AM    Specimen: Blood   Result Value Ref Range    Glucose 105 (H) 65 - 99 mg/dL    BUN 12 6 - 20 mg/dL    Creatinine 0.73 0.57 - 1.00 mg/dL    Sodium 137 136 - 145 mmol/L    Potassium 3.5 3.5 - 5.2 mmol/L    Chloride 103 98 - 107 mmol/L    CO2 24.0 22.0 - 29.0 mmol/L    Calcium 9.1 8.6 - 10.5 mg/dL    Total Protein 6.6 6.0 - 8.5 g/dL    Albumin 4.3 3.5 - 5.2 g/dL    ALT (SGPT) 16 1 - 33 U/L    AST (SGOT) 18 1 - 32 U/L    Alkaline Phosphatase 70 39 - 117 U/L    Total Bilirubin 0.4 0.0 - 1.2 mg/dL    Globulin 2.3 gm/dL    A/G Ratio 1.9 g/dL    BUN/Creatinine Ratio 16.4 7.0 - 25.0    Anion Gap 10.0 5.0 - 15.0 mmol/L    eGFR 104.8 >60.0 mL/min/1.73   Lipase    Collection Time: 05/15/25 10:47 AM    Specimen: Blood   Result Value Ref Range    Lipase 18 13 - 60 U/L   Urinalysis With Microscopic If Indicated (No Culture) - Urine, Clean Catch    Collection Time: 05/15/25 10:47 AM    Specimen: Urine, Clean Catch   Result Value Ref Range    Color, UA Yellow Yellow, Straw    Appearance, UA Cloudy (A) Clear    pH, UA 8.5 (H) 5.0 - 8.0    Specific Gravity, UA 1.013 1.001 - 1.030    Glucose, UA Negative Negative    Ketones, UA Negative Negative    Bilirubin, UA Negative Negative    Blood, UA Negative Negative    Protein, UA Negative Negative    Leuk Esterase, UA Negative Negative    Nitrite, UA Negative Negative    Urobilinogen, UA 0.2 E.U./dL 0.2 - 1.0 E.U./dL   CBC Auto Differential    Collection Time: 05/15/25 10:47 AM    Specimen: Blood   Result Value Ref Range    WBC 5.32 3.40 - 10.80 10*3/mm3    RBC 4.16 3.77 - 5.28 10*6/mm3    Hemoglobin 13.5 12.0 - 15.9 g/dL    Hematocrit 39.3 34.0 - 46.6 %    MCV 94.5 79.0 - 97.0 fL    MCH 32.5 26.6 - 33.0 pg    MCHC 34.4 31.5 - 35.7 g/dL    RDW 11.9 (L) 12.3 - 15.4 %    RDW-SD 41.2 37.0 - 54.0 fl    MPV 10.5 6.0 - 12.0 fL    Platelets 166 140 - 450 10*3/mm3    Neutrophil % 58.5 42.7 - 76.0 %    Lymphocyte % 26.9 19.6 - 45.3 %    Monocyte  % 12.6 (H) 5.0 - 12.0 %    Eosinophil % 0.8 0.3 - 6.2 %    Basophil % 0.8 0.0 - 1.5 %    Immature Grans % 0.4 0.0 - 0.5 %    Neutrophils, Absolute 3.12 1.70 - 7.00 10*3/mm3    Lymphocytes, Absolute 1.43 0.70 - 3.10 10*3/mm3    Monocytes, Absolute 0.67 0.10 - 0.90 10*3/mm3    Eosinophils, Absolute 0.04 0.00 - 0.40 10*3/mm3    Basophils, Absolute 0.04 0.00 - 0.20 10*3/mm3    Immature Grans, Absolute 0.02 0.00 - 0.05 10*3/mm3    nRBC 0.0 0.0 - 0.2 /100 WBC   High Sensitivity Troponin T    Collection Time: 05/15/25 10:47 AM    Specimen: Blood   Result Value Ref Range    HS Troponin T <6 <14 ng/L   Urinalysis, Microscopic Only - Urine, Clean Catch    Collection Time: 05/15/25 10:47 AM    Specimen: Urine, Clean Catch   Result Value Ref Range    RBC, UA 6-10 (A) None Seen, 0-2 /HPF    WBC, UA 0-2 None Seen, 0-2 /HPF    Bacteria, UA None Seen None Seen, Trace /HPF    Squamous Epithelial Cells, UA 0-2 None Seen, 0-2 /HPF    Hyaline Casts, UA 0-6 0 - 6 /LPF    Methodology Automated Microscopy    Duplex Venous Lower Extremity - Left CAR    Collection Time: 05/15/25 11:15 AM   Result Value Ref Range    BH CV VAS PRELIMINARY FINDINGS SCRIPTING 1.0     Right Common Femoral Spont Y     Right Common Femoral Phasic Y     Right Common Femoral Augment Y     Left Common Femoral Spont Y     Left Common Femoral Phasic Y     Left Common Femoral Compress C     Left Common Femoral Augment Y     Left Saphenofemoral Junction Spont Y     Left Saphenofemoral Junction Phasic Y     Left Saphenofemoral Junction Compress C     Left Saphenofemoral Junction Augment Y     Left Profunda Femoral Compress C     Left Proximal Femoral Spont Y     Left Proximal Femoral Phasic Y     Left Proximal Femoral Compress C     Left Proximal Femoral Augment Y     Left Mid Femoral Spont Y     Left Mid Femoral Phasic Y     Left Mid Femoral Compress C     Left Mid Femoral Augment Y     Left Distal Femoral Spont Y     Left Distal Femoral Phasic Y     Left Distal Femoral  Compress C     Left Distal Femoral Augment Y     Left Popliteal Spont Y     Left Popliteal Phasic Y     Left Popliteal Compress C     Left Popliteal Augment Y     Left Posterior Tibial Compress C     Left Peroneal Compress C     Left Gastronemius Compress C     Left Greater Saph AK Compress C     Left Greater Saph BK Compress C     Left Lesser Saph Compress C    ECG 12 Lead Other; epigastric pain, bloating.    Collection Time: 05/15/25 11:37 AM   Result Value Ref Range    QT Interval 434 ms    QTC Interval 429 ms       If labs were ordered, I independently reviewed the results and considered them in treating the patient.    RADIOLOGY  CT Angio Abdominal Aorta Bilateral Iliofem Runoff   Final Result   Impression:   1.No evidence of abdominal aortic aneurysm or dissection.   2.No evidence of significant aortoiliac, femoropopliteal or infrapopliteal arterial occlusive disease.   3.Probable corpus luteal cyst in the left ovary. Trace free fluid in the pelvis may be due to recent cyst rupture.   4.Additional findings as given above.            Electronically Signed: Jamal Rosales MD     5/15/2025 1:02 PM EDT     Workstation ID: [id number]        [] Radiologist's Report Reviewed:  I ordered and independently interpreted the above noted radiographic studies.  See radiologist's dictation for official interpretation.      PROCEDURES    Procedures    ECG 12 Lead Other; epigastric pain, bloating.   Preliminary Result   Test Reason : Other~   Blood Pressure :   */*   mmHG   Vent. Rate :  59 BPM     Atrial Rate :  59 BPM      P-R Int : 152 ms          QRS Dur :  86 ms       QT Int : 434 ms       P-R-T Axes :  74  30  45 degrees     QTcB Int : 429 ms      Sinus bradycardia   Possible Left atrial enlargement   Borderline ECG   No previous ECGs available      Referred By: EDMD           Confirmed By:           MEDICATIONS GIVEN IN ER    Medications   sodium chloride 0.9 % bolus 1,000 mL (0 mL Intravenous Stopped 5/15/25 2999)    iopamidol (ISOVUE-370) 76 % injection 100 mL (125 mL Intravenous Given 5/15/25 1228)       MEDICAL DECISION MAKING, PROGRESS, and CONSULTS   Medical Decision Making  Marielos Moore is a 44 y.o. female who presents to the ED c/o abd pain and Lt leg pain.  Patient reports that she has had abdominal discomfort over the past couple weeks.  She complains of abdominal pain, bloating.  She reports nausea but denies vomiting.  Patient did have diarrhea 1 day ago.  She explains that she recently started taking magnesium a week ago and is concerned that this is causing her symptoms.  She denies any urinary frequency, burning, urgency.  Patient has had some discomfort in her left lower extremity.  She reports that it starts in her left posterior hip radiates into her groin and down her leg.  She denies any history of DVT, PE.  However she is concerned this could be a blood clot.  She also thought it could possibly be sciatica.  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    Today patient went to physical therapy.  She is going to PT for her pelvic floor.  Patient explains that the PT was advised of all the patient's symptoms.  She was pressing on her abdomen and thought she felt a pulsatile mass.  Patient denies any history of AAA.  Patient denies chest pain, shortness of breath.      Problems Addressed:  Generalized abdominal pain: complicated acute illness or injury     Details: CT of the abdomen and pelvis is negative for acute findings.  Pain of left lower extremity: complicated acute illness or injury     Details: Patient complains of pain in her left lower extremity.  Duplex is interpreted as negative for DVT, SVT.  Sciatica of left side: complicated acute illness or injury     Details: We will treat patient for sciatica.  Patient to take anti-inflammatory and muscle relaxant.    Amount and/or Complexity of Data Reviewed  Labs: ordered. Decision-making details documented in ED  Course.  Radiology: ordered. Decision-making details documented in ED Course.  ECG/medicine tests: ordered.    Risk  Prescription drug management.        Discussion below represents my analysis of pertinent findings related to patient's condition, differential diagnosis, treatment plan and final disposition.    Assessment includes with Differential diagnosis including but is not limited to: Intra-abdominal abnormality, AAA, DVT, SVT, sciatica    Additional sources  Discussed/ obtained information from independent historians:   [] Spouse  [] Parent  [] Family member  [] Friend  [] EMS   [] Other:  External (non-ED) record review:   [] Inpatient record:   [] Office record:   [x] Outpatient record:   [x] Prior Outpatient labs:   [x] Prior Outpatient radiology:   [] Primary Care record:   [] Outside ED record:   [] Other:   Patient's care impacted by:   [] Diabetes  [] Hypertension  [] Hyperlipidemia  [] Hypothyroidism   [] Coronary Artery Disease  [] Congestive Heart Failure   [] COPD   [] Cancer   [] Obesity  [x] GERD   [] Tobacco Abuse   [] Substance Abuse    [x] Anxiety   [] Depression   [] Other:   Care significantly affected by Social Determinants of Health (housing and economic circumstances, unemployment)    [] Yes     [x] No   If yes, Patient's care significantly limited by  Social Determinants of Health including:   [] Inadequate housing   [] Low income   [] Alcoholism and drug addiction in family   [] Problems related to primary support group   [] Unemployment   [] Problems related to employment   [] Other Social Determinants of Health:     Orders placed during this visit:  Orders Placed This Encounter   Procedures    CT Angio Abdominal Aorta Bilateral Iliofem Runoff    Comprehensive Metabolic Panel    Lipase    Urinalysis With Microscopic If Indicated (No Culture) - Urine, Clean Catch    CBC Auto Differential    High Sensitivity Troponin T    Urinalysis, Microscopic Only - Urine, Clean Catch    ECG 12 Lead  Other; epigastric pain, bloating.    CBC & Differential       I considered prescription management  with:   [] Pain medication  [] Antiviral  [] Antibiotic   [] Other:   Rationale:  Additional orders considered but not ordered:  The following testing was considered but ultimately not selected after discussion with patient/family:  ED Course:    ED Course as of 05/15/25 1921   Thu May 15, 2025   1100 CBC reviewed and interpreted as within normal limits.  Patient's white blood cell count is 5.32, hemoglobin 13.5, hematocrit 39.3. [KG]   1100 Comprehensive Metabolic Panel(!)  Sodium, potassium, chloride are interpreted as negative for acute findings.  Patient's glucose is at 105. [KG]   1315 CT angio is negative for acute abnormality.  Venous duplex is interpreted as negative for SVT, DVT. [KG]   1330 Shared decision making with patient.  Imaging is negative for acute findings.  Lab work is unremarkable.  At today's visit we will discharge patient home with prescription for anti-inflammatory muscle relaxant.  Patient to take meds as ordered.  Patient to follow-up as needed.  Patient agrees and verbalized understanding. [KG]      ED Course User Index  [KG] Nicole Sanchez, EDITH            DIAGNOSIS  Final diagnoses:   Generalized abdominal pain   Pain of left lower extremity   Sciatica of left side       DISPOSITION    DISCHARGE    Patient discharged in stable condition.    Reviewed implications of results, diagnosis, meds, responsibility to follow up, warning signs and symptoms of possible worsening, potential complications and reasons to return to ER.    Patient/Family voiced understanding of above instructions.    Discussed plan for discharge, as there is no emergent indication for admission.  Pt/family is agreeable and understands need for follow up and possible repeat testing.  Pt/family is aware that discharge does not mean that nothing is wrong but that it indicates no emergency is currently present that  requires admission and they must continue care with follow-up as given below or with a physician of their choice.     FOLLOW-UP  Stephen Brooks MD  [address]  [address]  [place] [address]  [phone]               Medication List        New Prescriptions      methocarbamol 750 MG tablet  Commonly known as: ROBAXIN  Take 1 tablet by mouth 3 (Three) Times a Day for 7 days.     naproxen 500 MG tablet  Commonly known as: NAPROSYN  Take 1 tablet by mouth 2 (Two) Times a Day As Needed for Mild Pain.               Where to Get Your Medications        These medications were sent to [facility] [id number] - [place], [place] - [address] [place] [address] AT [address] - [phone]  - [phone] [place] [id number]      Phone: [phone]   methocarbamol 750 MG tablet  naproxen 500 MG tablet          ED Disposition       ED Disposition   Discharge    Condition   Stable    Comment   --                   Nicole Sanchez, APRN  05/15/25 4391

## 2025-05-16 ENCOUNTER — APPOINTMENT (OUTPATIENT)
Dept: PHYSICAL THERAPY | Facility: HOSPITAL | Age: 45
End: 2025-05-16
Payer: COMMERCIAL

## 2025-05-20 ENCOUNTER — APPOINTMENT (OUTPATIENT)
Dept: PHYSICAL THERAPY | Facility: HOSPITAL | Age: 45
End: 2025-05-20
Payer: COMMERCIAL

## 2025-05-23 ENCOUNTER — HOSPITAL ENCOUNTER (OUTPATIENT)
Dept: PHYSICAL THERAPY | Facility: HOSPITAL | Age: 45
Setting detail: THERAPIES SERIES
Discharge: HOME | End: 2025-05-23
Payer: COMMERCIAL

## 2025-05-23 DIAGNOSIS — R19.8 PAIN WITH BOWEL MOVEMENTS: ICD-10-CM

## 2025-05-23 DIAGNOSIS — M62.89 PELVIC FLOOR DYSFUNCTION: Primary | ICD-10-CM

## 2025-05-23 DIAGNOSIS — R10.2 VULVAR PAIN: ICD-10-CM

## 2025-05-23 DIAGNOSIS — M62.89 PELVIC FLOOR TENSION: ICD-10-CM

## 2025-05-23 PROCEDURE — 97140 MANUAL THERAPY 1/> REGIONS: CPT

## 2025-05-23 PROCEDURE — 97112 NEUROMUSCULAR REEDUCATION: CPT

## 2025-05-23 NOTE — THERAPY TREATMENT NOTE
Physical Therapy Daily Note      Patient: Marielos Moore   : 1980  MRN: [id number]   Diagnosis/ICD-10 Code:      ICD-10-CM ICD-9-CM   1. Pelvic floor dysfunction  M62.89 618.83   2. Vulvar pain  R10.2 625.9   3. Pelvic floor tension  M62.89 597.81   4. Pain with bowel movements  R19.8 564.00      Referring practitioner: Diana Hernandez*  Today's Date: 2025    Subjective:   Patient reports: patient reports she has been thinking about changing her diet d/t bloating. She wanted  to read her CT scan from her recent ER visit. She stated an MD 20 years ago told her she had a hernia and reported an increase in straining for BM since 2024.  Pain: 7/10 at perineum  Treatment has included: neuromuscular re-education and manual therapy      Subjective      Objective   See Exercise, Manual, and Modality Logs for complete treatment.     25 1500   Total Minutes   62010 - PT Manual Therapy Minutes 30  (inc time for subjective)   Manual Rx 1   Manual Rx 1 Location pernium scar massage at 3 points in perniuem-theils massage   Manual Rx 1 Grade 1   Manual Rx 2   Manual Rx 2 Location internal muscle release to all of B superficial and intermediate layers, each spot requiring 1-3 minutes to release   Manual Rx 2 Grade 1     Verbal consent obtained for internal pelvic exam/treatment with declined need for second person in room     25 1500   Total Minutes   04060 -  PT Neuromuscular Reeducation Minutes 10   Exercise 1   Exercise Name 1 TA contractions in supine and quadruped   Cueing 1 Verbal;Tactile;Demo   Time 1 10 mins   Additional Comments cues for full exhale and stable pelvis. Trialed marching leg     PT Pelvic     Balance Skills Training  06504 -  PT Neuromuscular Reeducation Minutes: 10    Assessment & Plan       Assessment  Assessment details: Session focused on core support program to protect umbilical hernia and manual therapy. Patient able to have good contraction of TA with  skilled verbal and tactile cues. Patient would continue to benefit from skilled PT services to achieve highest level of functioning.      Plan  Plan details: Patient to continue with PT services to improve PF coordination, PF strength, core strength, decrease pain and improve bowel/bladder function.            Therapy Charges for Today       Code Description Service Date Service Provider Modifiers Qty    71584363363 HC PT NEUROMUSC RE EDUCATION EA 15 MIN 5/23/2025 Juliette Atwood, PT GP 1    [id number] HC PT MANUAL THERAPY EA 15 MIN 5/23/2025 Juliette Atwood, PT GP 2          PT SIGNATURE: Juliette Atwood PT, DPT  [place] License # [id number]  Physical Therapist

## 2025-05-29 ENCOUNTER — HOSPITAL ENCOUNTER (OUTPATIENT)
Dept: PHYSICAL THERAPY | Facility: HOSPITAL | Age: 45
Setting detail: THERAPIES SERIES
Discharge: HOME | End: 2025-05-29
Payer: COMMERCIAL

## 2025-05-29 DIAGNOSIS — M62.89 PELVIC FLOOR DYSFUNCTION: Primary | ICD-10-CM

## 2025-05-29 DIAGNOSIS — R19.8 PAIN WITH BOWEL MOVEMENTS: ICD-10-CM

## 2025-05-29 DIAGNOSIS — R10.2 VULVAR PAIN: ICD-10-CM

## 2025-05-29 DIAGNOSIS — M62.89 PELVIC FLOOR TENSION: ICD-10-CM

## 2025-05-29 PROCEDURE — 97110 THERAPEUTIC EXERCISES: CPT

## 2025-05-29 PROCEDURE — 97140 MANUAL THERAPY 1/> REGIONS: CPT

## 2025-05-29 NOTE — THERAPY TREATMENT NOTE
Physical Therapy Daily Note      Patient: Marielos Moore   : 1980  MRN: [id number]   Diagnosis/ICD-10 Code:      ICD-10-CM ICD-9-CM   1. Pelvic floor dysfunction  M62.89 618.83   2. Vulvar pain  R10.2 625.9   3. Pelvic floor tension  M62.89 597.81   4. Pain with bowel movements  R19.8 564.00      Referring practitioner: Diana Hernandez*  Today's Date: 2025    Subjective:   Patient reports: Patient reports she is unsure what to do at the gym. She ended up using suppositories recently and they were more effective than liquid bowel supplement. She has questions about her small umbilical hernia. She had success using a small silicone dilator at home for vaginal muscle relaxation and scar massage  Pain: 7/10 at perineum  Treatment has included: therapeutic exercise and manual therapy      Subjective      Objective   See Exercise, Manual, and Modality Logs for complete treatment.     25 0900   Total Minutes   20864 - PT Therapeutic Exercise Minutes 8   Exercise 1   Exercise Name 1 review of HEP: cues belly sink on exhale   Cueing 1 Verbal   Exercise 2   Exercise Name 2 instruction to exhale on lifting at gym, instruction on avoiding abdominal shearing or increasing abdominal pressure during exercises   Cueing 2 Verbal     Verbal consent obtained for internal pelvic exam/treatment with declined need for second person in room     25 0900   Total Minutes   30776 - PT Manual Therapy Minutes 30   Manual Rx 1   Manual Rx 1 Location pernium scar massage at 3 points in perniuem-theils massage   Manual Rx 1 Grade 1   Manual Rx 2   Manual Rx 2 Location internal muscle release to deep and B superfici intermediate layers, each spot requiring 1-3 minutes to release   Manual Rx 2 Grade 1     PT Pelvic          Assessment & Plan       Assessment  Assessment details: Patient educated on seeing colorectal specialist for constipation; she will consider and then get a referral from her PCP later. She is  going to perform daily or every other day suppository/enema clean out this week d/t stool being stuck in her rectum. She verbalized knowledge to drink extra water and stay hydrated. HEP reviewed and good response to manual therapy with notable toned puborectalis releasing bilaterally after 1 min ea side. Patient would continue to benefit from skilled PT services to achieve highest level of functioning.      Plan  Plan details: Patient to continue with PT services to improve PF coordination, PF strength, core strength, decrease pain and improve bowel/bladder function.            Therapy Charges for Today       Code Description Service Date Service Provider Modifiers Qty    24978032205  PT THER PROC EA 15 MIN 5/29/2025 Juliette Atwood, PT  1    79444825435 HC PT MANUAL THERAPY EA 15 MIN 5/29/2025 Juliette Atwood, PT GP 2          PT SIGNATURE: Juliette Atwood PT, DPT  [place] License # [id number]  Physical Therapist

## 2025-06-03 ENCOUNTER — HOSPITAL ENCOUNTER (OUTPATIENT)
Dept: PHYSICAL THERAPY | Facility: HOSPITAL | Age: 45
Setting detail: THERAPIES SERIES
Discharge: HOME | End: 2025-06-03
Payer: COMMERCIAL

## 2025-06-03 DIAGNOSIS — R19.8 PAIN WITH BOWEL MOVEMENTS: ICD-10-CM

## 2025-06-03 DIAGNOSIS — M62.89 PELVIC FLOOR DYSFUNCTION: Primary | ICD-10-CM

## 2025-06-03 DIAGNOSIS — M62.89 PELVIC FLOOR TENSION: ICD-10-CM

## 2025-06-03 DIAGNOSIS — R10.2 VULVAR PAIN: ICD-10-CM

## 2025-06-03 PROCEDURE — 97112 NEUROMUSCULAR REEDUCATION: CPT

## 2025-06-03 PROCEDURE — 97140 MANUAL THERAPY 1/> REGIONS: CPT

## 2025-06-03 NOTE — THERAPY TREATMENT NOTE
Physical Therapy Daily Note      Patient: Marielos Moore   : 1980  MRN: [id number]   Diagnosis/ICD-10 Code:      ICD-10-CM ICD-9-CM   1. Pelvic floor dysfunction  M62.89 618.83   2. Vulvar pain  R10.2 625.9   3. Pelvic floor tension  M62.89 597.81   4. Pain with bowel movements  R19.8 564.00      Referring practitioner: Diana Hernandez*  Today's Date: 6/3/2025    Subjective:   Patient reports: she has completed enemas and it does flush out the end of her rectum. She wants to go over gym exercises to make sure she is protecting her hernia  Pain: 0  Treatment has included: neuromuscular re-education and manual therapy      Subjective      Objective   See Exercise, Manual, and Modality Logs for complete treatment.     25 0900   Total Minutes   44222 - PT Manual Therapy Minutes 10   Manual Rx 3   Manual Rx 3 Location colon massage followed by Pt returning demo with handout   Manual Rx 3 Duration 10 mins      25 1000   Total Minutes   96683 -  PT Neuromuscular Reeducation Minutes 28   Exercise 1   Exercise Name 1 curtsy lunges   Cueing 1 Verbal;Tactile;Demo   Reps 1 5   Additional Comments cues for TA sink on exhale   Exercise 2   Exercise Name 2 hip hinge/deadlift w/medicine ball   Cueing 2 Verbal;Tactile;Demo   Reps 2 5   Additional Comments #6 ball, cues for pushing floor away and TA sink on exhale   Exercise 3   Exercise Name 3 hip thrusts w/medicine ball #6   Cueing 3 Verbal;Tactile;Demo   Reps 3 8   Additional Comments cues for thrusting at 1/2 exhale w/TA contraction   Exercise 4   Exercise Name 4 standing hip abduction/extension/adduction using cable machine vs seated machine at gym   Cueing 4 Verbal;Demo   Reps 4 5   Exercise 5   Exercise Name 5 tricep extension in 2 positions, using cable machine   Cueing 5 Verbal;Tactile;Demo   Reps 5 5   Additional Comments cues for flat spine, cues for TA contraction on exhale       PT Pelvic     Balance Skills Training  15333 -  PT  Neuromuscular Reeducation Minutes: 28    Assessment & Plan       Assessment  Assessment details: Session focused on streamlining HEP, modification of exercises from her gym, and colon massage. She required max tactile and verbal cueing for timing exhale, not arching spine into extension (further shearing diastasis), and TA contraction to support core during gym exercises. Pt to consider appointment with colorectal specialist to further assess constipation. Patient would continue to benefit from skilled PT services to achieve highest level of functioning.      Plan  Plan details: Patient to continue with PT services to improve PF coordination, PF strength, core strength, decrease pain and improve bowel/bladder function.            Therapy Charges for Today       Code Description Service Date Service Provider Modifiers Qty    12134468085 HC PT NEUROMUSC RE EDUCATION EA 15 MIN 6/3/2025 Juliette Atwood, PT GP 2    40264924335 HC PT MANUAL THERAPY EA 15 MIN 6/3/2025 Juliette Atwood, PT GP 1          PT SIGNATURE: Juliette Atwood PT, DPT  [place] License # [id number]  Physical Therapist

## 2025-06-05 ENCOUNTER — APPOINTMENT (OUTPATIENT)
Dept: PHYSICAL THERAPY | Facility: HOSPITAL | Age: 45
End: 2025-06-05
Payer: COMMERCIAL

## 2025-06-10 ENCOUNTER — OFFICE VISIT (OUTPATIENT)
Dept: OBSTETRICS AND GYNECOLOGY | Facility: CLINIC | Age: 45
End: 2025-06-10
Payer: COMMERCIAL

## 2025-06-10 ENCOUNTER — APPOINTMENT (OUTPATIENT)
Dept: PHYSICAL THERAPY | Facility: HOSPITAL | Age: 45
End: 2025-06-10
Payer: COMMERCIAL

## 2025-06-10 VITALS
WEIGHT: 140 LBS | BODY MASS INDEX: 22.5 KG/M2 | HEIGHT: 66 IN | DIASTOLIC BLOOD PRESSURE: 70 MMHG | SYSTOLIC BLOOD PRESSURE: 102 MMHG

## 2025-06-10 DIAGNOSIS — N94.10 FEMALE DYSPAREUNIA: Primary | ICD-10-CM

## 2025-06-10 NOTE — PROGRESS NOTES
No chief complaint on file.      Subjective   HPI  Marielos Moore is a 44 y.o. female, . Her last LMP was No LMP recorded. (Menstrual status: Tubal ligation).. who presents for follow up on vaginal pain and bleeding.    Patient is doing Pelvic floor therapy.  Thinks helping just slow.  But is still very constipated and now has umbilical hernia because of it.  Had to do mag citrate.  Is seeing Dr. Robles tomorrow, colorectal, to see if anything else needs to be done.    At her last visit she was treated with estrodiol. Since then she reports her symptoms/issue has remained unchanged. The patient reports additional symptoms as none.        Additional OB/GYN History     Last Pap :   Last Completed Pap Smear    This patient has no relevant Health Maintenance data.         Last mammogram:   Last Completed Mammogram    This patient has no relevant Health Maintenance data.         Tobacco Usage?: No   OB History          2    Para   2    Term   2            AB        Living   2         SAB        IAB        Ectopic        Molar        Multiple        Live Births   2                  Current Outpatient Medications:     diazePAM (Valium) 5 MG tablet, Insert 1 tablet PV as high as possible nightly for 14 nights., Disp: 14 tablet, Rfl: 0    diphenhydrAMINE (BENADRYL) 25 mg capsule, Take 1 capsule by mouth Every 6 (Six) Hours As Needed for Itching., Disp: , Rfl:     estradiol (ESTRACE VAGINAL) 0.1 MG/GM vaginal cream, Insert 1 gm intravaginally 2 times each week for 3 weeks and then weekly thereafter., Disp: 1 each, Rfl: 12    levocetirizine (XYZAL) 5 MG tablet, Take 1 tablet by mouth Every Evening., Disp: , Rfl:     lidocaine (XYLOCAINE) 2 % jelly, Apply  topically to the appropriate area as directed 2 (Two) Times a Day. (Patient not taking: Reported on 2025), Disp: 28.33 g, Rfl: 0    naproxen (NAPROSYN) 500 MG tablet, Take 1 tablet by mouth 2 (Two) Times a Day As Needed for Mild Pain.  "(Patient not taking: Reported on 6/23/2025), Disp: 20 tablet, Rfl: 0    silver sulfadiazine (SILVADENE, SSD) 1 % cream, Apply 1 Application topically to the appropriate area as directed 2 (Two) Times a Day. (Patient not taking: Reported on 6/23/2025), Disp: 25 g, Rfl: 0    clonazePAM (KlonoPIN) 1 MG tablet, Take 1 tablet by mouth At Night As Needed for Anxiety., Disp: 30 tablet, Rfl: 0    linaclotide (Linzess) 290 MCG capsule capsule, Take 1 capsule by mouth Every Morning Before Breakfast., Disp: 8 capsule, Rfl: 0    lubiprostone (AMITIZA) 8 MCG capsule, Take 1 capsule by mouth 2 (Two) Times a Day With Meals., Disp: , Rfl:      Past Medical History:   Diagnosis Date    Anxiety     GERD (gastroesophageal reflux disease)     Menorrhagia 11/21/2024        Past Surgical History:   Procedure Laterality Date    BREAST SURGERY      CHOLECYSTECTOMY      COLONOSCOPY      COMBINED HYSTEROSCOPY DIAGNOSTIC / D & C  11/21/2024    LABIAPLASTY  12/23/2024    SALPINGECTOMY Bilateral 12/23/2024       The additional following portions of the patient's history were reviewed and updated as appropriate: allergies and current medications.    Review of Systems    I have reviewed and agree with the HPI, ROS, and historical information as entered above. Diana Hernandez MD      Objective   /70   Ht 167.6 cm (66\")   Wt 63.5 kg (140 lb)   BMI 22.60 kg/m²     Physical Exam    Physical Exam:  General:  well developed; well nourished  no acute distress  mentation appropriate   Abdomen: soft, non-tender; no masses  no hepato-splenomegaly  ~1.5cm umbilical hernia without strangulation   Pelvis: Clinical staff was present for exam  External genitalia:  normal appearance of the external genitalia including Bartholin's and Weldona's glands.  :  urethral meatus normal; urethra normal:  Vaginal:  normal pink mucosa without prolapse or lesions. caliber of the introitus is small but not excessively small;        Assessment & Plan "     Assessment     Problem List Items Addressed This Visit       Female dyspareunia - Primary    Relevant Medications    diazePAM (Valium) 5 MG tablet         Plan     Did try vaginal valium.  States lots of skin being pulled in with intercourse.  Scar massage.  Has gained weight.  F/u with Dr. Robles as scheduled  Return if symptoms worsen or fail to improve.        Diana Hernandez MD  06/10/2025

## 2025-06-12 ENCOUNTER — HOSPITAL ENCOUNTER (OUTPATIENT)
Dept: PHYSICAL THERAPY | Facility: HOSPITAL | Age: 45
Setting detail: THERAPIES SERIES
Discharge: HOME | End: 2025-06-12
Payer: COMMERCIAL

## 2025-06-12 DIAGNOSIS — M62.89 PELVIC FLOOR DYSFUNCTION: Primary | ICD-10-CM

## 2025-06-12 DIAGNOSIS — R10.2 VULVAR PAIN: ICD-10-CM

## 2025-06-12 DIAGNOSIS — R19.8 PAIN WITH BOWEL MOVEMENTS: ICD-10-CM

## 2025-06-12 DIAGNOSIS — M62.89 PELVIC FLOOR TENSION: ICD-10-CM

## 2025-06-12 PROCEDURE — 97112 NEUROMUSCULAR REEDUCATION: CPT

## 2025-06-12 PROCEDURE — 97140 MANUAL THERAPY 1/> REGIONS: CPT

## 2025-06-12 PROCEDURE — 97110 THERAPEUTIC EXERCISES: CPT

## 2025-06-12 PROCEDURE — 97032 APPL MODALITY 1+ESTIM EA 15: CPT

## 2025-06-12 NOTE — THERAPY PROGRESS REPORT/RE-CERT
PT Progress Note  [facility] [place]  [address]    Patient: Marielos Moore   : 1980  Diagnosis/ICD-10 Code:      ICD-10-CM ICD-9-CM   1. Pelvic floor dysfunction  M62.89 618.83   2. Vulvar pain  R10.2 625.9   3. Pelvic floor tension  M62.89 597.81   4. Pain with bowel movements  R19.8 564.00      MRN: [id number]    Referring practitioner: Diana Hernandez*  Today's Date: 2025    Subjective:   Patient reports: colorectal gave medication options and suggested low fod map diet, gyn stated she may need another specialist  Pain: 7/10 at perineum during internal release  Clinical Progress: improved  Home Program Compliance: Yes  Treatment has included: therapeutic exercise, neuromuscular re-education, and manual therapy      Subjective    Objective   See Exercise, Manual, and Modality Logs for complete treatment.     25 0900   Precautions   Existing Precautions/Restrictions no known precautions/restrictions   ELECTRICAL STIMULATION   Attended/Unattended Attended, during internal release   Stimulation Type Pre-Mod   Max mAmp 13.5   Location/Electrode Placement/Other sacral nerves, no adverse reaction   60556 - PT E-Stim Attended Minutes 20      25 1000   Total Minutes   61969 - PT Therapeutic Exercise Minutes 25   98949 -  PT Neuromuscular Reeducation Minutes 30   Exercise 1   Exercise Name 1 review of HEP   Exercise 2   Exercise Name 2 bowel regimen schedule   Cueing 2 Verbal   Additional Comments emailed   Exercise 3   Exercise Name 3 happy baby   Cueing 3 Verbal   Additional Comments HEP   Exercise 4   Exercise Name 4 doorway stretch with support under bottom   Cueing 4 Tactile;Verbal;Demo   Additional Comments HEP   Exercise 5   Exercise Name 5 TA bracing with quadruped arm lifts, cues for not overgripping EO   Cueing 5 Verbal;Demo;Tactile   Reps 5 15   Additional Comments videod on patient phone   Exercise 6   Exercise Name 6 90/90 w/hamstring pull, hip  adduction, and arm reach. Trialed lifting LE w/good success and stable core   Cueing 6 Verbal;Tactile;Demo   Time 6 10 mins   Additional Comments pillow under shoulders to decrease spine arch     Verbal consent obtained for internal pelvic exam/treatment with declined need for second person in room.       06/12/25 0900   Total Minutes   34523 - PT Manual Therapy Minutes 30   Manual Rx 1   Manual Rx 1 Location pernium scar massage at 3 points in perniuem-theils massage   Manual Rx 1 Grade 1   Manual Rx 2   Manual Rx 2 Location internal muscle release to deep and B superfici intermediate layers, each spot requiring 1-3 minutes to release. Able to release coxxygeus and puborectalis. L side PF more tension this date   Manual Rx 2 Grade 1       PT Pelvic    Balance Skills Training  32034 -  PT Neuromuscular Reeducation Minutes: 30    Assessment & Plan       Assessment  Assessment details: Good response to sacral estim during internal PF release with muscles releasing but Pt still experiencing lingering fascial/skin pain. Pt met 1 STG this date with good progress toward complete bowel emptying and good progress toward tolerable perineum stretching. HEP and core program progressed with bowel regimen reviewed. Patient to trial gentle perineum stretches with happy baby and supported doorway. Patient would continue to benefit from additional visits for skilled PT services to achieve highest level of functioning and meet goals.    Goals  Plan Goals: STG's: 3 weeks  1. Patient will report 50% reduction in pain during scar tissue massage for improved QOL and activity participation. ONGOING  2. Patient will be able to perform HEP with minimal verbal cues, for improved exercise participation and optimal recovery. MET  3. Patient will report 50% reduction in pain during intercourse, for improved QOL and activity participation. ONGOING  4. Patient will demo 50% reduction in painful points in pelvic floor muscle palpation, for  improved activity tolerance and optimal recovery. ONGOING  5. Patient will have 50% less pain during bowel movements to improve QOL. ONGOING  6. Patient to experience 50% improved bowel emptying to decrease pressure on the pelvic floor muscles. ONGOING     LTG's: 6 weeks  1. Patient will report no pain during scar tissue massage for improved QOL and activity participation. ONGOING  2. Patient will be independent with HEP for improved exercise participation and decreased risk of re-injury. ONGOING  3. Patient will report no pain during intercourse, for improved QOL and activity participation. ONGOING  4.  Patient will have no painful points in pelvic floor muscle palpation, for improved activity tolerance and optimal recovery. ONGOING  5. Patient will have 100% less pain during bowel movements to improve QOL. ONGOING  6. Patient to experience 100% improved bowel emptying to decrease pressure on the pelvic floor muscles. ONGOING  7. Patient to have adequate length/tension movement of PF muscles during PF examination for improved strength and QOL. ONGOING      Plan  Plan details: Therapy options: will be seen for skilled physical therapy services  Planned modality interventions: TENS, ultrasound, cryotherapy, thermotherapy (hydrocollator packs) and high voltage pulsed current (pain management)  Planned therapy interventions: abdominal trunk stabilization, manual therapy, neuromuscular re-education, body mechanics training, flexibility, functional ROM exercises, gait training, home exercise program, joint mobilization, therapeutic activities, stretching, strengthening, spinal/joint mobilization, soft tissue mobilization and postural training. CPT codes to include: 35282, 92210, 20534, 93661, 67243, and 02569.  Frequency: 2 x per week  Visits:  6  Treatment plan discussed with: patient              Progress toward previous goals: Partially Met      Recommendations: Continue as planned  Timeframe: 6 visits  Therapy  Charges for Today       Code Description Service Date Service Provider Modifiers Qty    62358671271 HC PT NEUROMUSC RE EDUCATION EA 15 MIN 6/12/2025 Juliette Atwood, PT GP 2    33247163612 HC PT MANUAL THERAPY EA 15 MIN 6/12/2025 Juliette Atwood, PT GP 2    67993571148 HC PT THER PROC EA 15 MIN 6/12/2025 Juliette Atwood, PT  2    [id number] HC PT ELEC STIM EA-PER 15 MIN 6/12/2025 Juliette Atwood, PT GP 1          PT Signature: Juliette Atwood, PT, DPT  [place] License #: [id number]    Based upon review of the patient's progress and continued therapy plan, it is my medical opinion that Marielos Moore should continue physical therapy treatment at [facility] at [facility].    Signature: __________________________________  Diana Hernandez MD

## 2025-06-18 ENCOUNTER — HOSPITAL ENCOUNTER (OUTPATIENT)
Dept: PHYSICAL THERAPY | Facility: HOSPITAL | Age: 45
Setting detail: THERAPIES SERIES
Discharge: HOME | End: 2025-06-18
Payer: COMMERCIAL

## 2025-06-18 DIAGNOSIS — R19.8 PAIN WITH BOWEL MOVEMENTS: ICD-10-CM

## 2025-06-18 DIAGNOSIS — R10.2 VULVAR PAIN: ICD-10-CM

## 2025-06-18 DIAGNOSIS — M62.89 PELVIC FLOOR DYSFUNCTION: Primary | ICD-10-CM

## 2025-06-18 DIAGNOSIS — M62.89 PELVIC FLOOR TENSION: ICD-10-CM

## 2025-06-18 PROCEDURE — 97530 THERAPEUTIC ACTIVITIES: CPT

## 2025-06-18 PROCEDURE — 97140 MANUAL THERAPY 1/> REGIONS: CPT

## 2025-06-18 NOTE — THERAPY TREATMENT NOTE
Physical Therapy Daily Note      Patient: Marielos Moore   : 1980  MRN: [id number]   Diagnosis/ICD-10 Code:      ICD-10-CM ICD-9-CM   1. Pelvic floor dysfunction  M62.89 618.83   2. Vulvar pain  R10.2 625.9   3. Pelvic floor tension  M62.89 597.81   4. Pain with bowel movements  R19.8 564.00      Referring practitioner: Diana Hernandez*  Today's Date: 2025    Subjective:   Patient reports: reports she started a new GI medication and fiber supplement, had to complete a bowel clean out yesterday but still feels bloated  Pain: did not rate  Treatment has included: manual therapy and therapeutic activity      Subjective      Objective   See Exercise, Manual, and Modality Logs for complete treatment.     25 1500   Total Minutes   88251 - PT Manual Therapy Minutes 30   Manual Rx 3   Manual Rx 3 Location silicone cupping to external and internal obliques and lateral border of latissmus dorsi   Manual Rx 3 Duration no adverse reaction   Manual Rx 4   Manual Rx 4 Location vagus nerve stimulation through abdomen at intracostal angle, hips/knees 90/90 supported by chair   Manual Rx 4 Grade 1        25 1500   Moist Heat   MH Applied Yes   Location abdomen (8 extra towel layers for barrier over small umbilicle hernia)   PT Moist Heat Minutes 8   MH Prior to Rx   (during goals discussion and perineal care education)      25 1600   Exercise 1   Exercise Name 1 education on perneal massage strategies: sitz bath w/warm water followed by perineal massage using skin oil of choice. Taking estrogen cream as prescribed for optimal tissue health, colon massage after warm liquid   Cueing 1 Verbal   Time 1 8   Additional Comments emailed to patient       PT Pelvic     Transfers  45611 - PT Therapeutic Activity Minutes: 8    Assessment & Plan       Assessment  Assessment details: Session focused on abdominal tissue mobility, perineal massage education, and vagus nerve stimulation. Good response to  manual therapy with no adverse reaction. Patient would continue to benefit from skilled PT services to achieve highest level of functioning.      Plan  Plan details: Patient to continue with PT services to improve PF coordination, PF strength, core strength, decrease pain and improve bowel/bladder function.            Therapy Charges for Today       Code Description Service Date Service Provider Modifiers Qty    77712074661  PT MANUAL THERAPY EA 15 MIN 6/18/2025 Juliette Atwood, PT GP 2    [id number]  PT THERAPEUTIC ACT EA 15 MIN 6/18/2025 Juliette Atwood, PT GP 1          PT SIGNATURE: Juliette Atwood PT, DPT  [place] License # [id number]  Physical Therapist

## 2025-06-19 ENCOUNTER — APPOINTMENT (OUTPATIENT)
Dept: PHYSICAL THERAPY | Facility: HOSPITAL | Age: 45
End: 2025-06-19
Payer: COMMERCIAL

## 2025-06-23 ENCOUNTER — OFFICE VISIT (OUTPATIENT)
Age: 45
End: 2025-06-23
Payer: COMMERCIAL

## 2025-06-23 VITALS
BODY MASS INDEX: 22.23 KG/M2 | WEIGHT: 138.3 LBS | SYSTOLIC BLOOD PRESSURE: 104 MMHG | OXYGEN SATURATION: 98 % | HEART RATE: 62 BPM | HEIGHT: 66 IN | DIASTOLIC BLOOD PRESSURE: 64 MMHG

## 2025-06-23 DIAGNOSIS — G47.00 INSOMNIA, UNSPECIFIED TYPE: ICD-10-CM

## 2025-06-23 DIAGNOSIS — K59.09 CHRONIC CONSTIPATION: ICD-10-CM

## 2025-06-23 DIAGNOSIS — K42.9 UMBILICAL HERNIA WITHOUT OBSTRUCTION AND WITHOUT GANGRENE: Primary | ICD-10-CM

## 2025-06-23 PROCEDURE — 1159F MED LIST DOCD IN RCRD: CPT | Performed by: STUDENT IN AN ORGANIZED HEALTH CARE EDUCATION/TRAINING PROGRAM

## 2025-06-23 PROCEDURE — 99214 OFFICE O/P EST MOD 30 MIN: CPT | Performed by: STUDENT IN AN ORGANIZED HEALTH CARE EDUCATION/TRAINING PROGRAM

## 2025-06-23 PROCEDURE — 1160F RVW MEDS BY RX/DR IN RCRD: CPT | Performed by: STUDENT IN AN ORGANIZED HEALTH CARE EDUCATION/TRAINING PROGRAM

## 2025-06-23 RX ORDER — CLONAZEPAM 1 MG/1
1 TABLET ORAL NIGHTLY PRN
Qty: 30 TABLET | Refills: 0 | Status: SHIPPED | OUTPATIENT
Start: 2025-06-23

## 2025-06-23 RX ORDER — LUBIPROSTONE 8 UG/1
8 CAPSULE ORAL 2 TIMES DAILY WITH MEALS
COMMUNITY
Start: 2025-06-16 | End: 2025-06-30

## 2025-06-23 NOTE — PROGRESS NOTES
Office Note     Name: Marielos Moore    : 1980     MRN: [id number]     Chief Complaint  Med Refill and GI Problem (bloating)    Subjective     History of Present Illness:  Marielos Moore is a 44 y.o. female who presents today for follow up of chronic conditions. She has been struggling with constipation, dyspareunia, dyspepsia, and general abdominal bloating.  She is concerned about her liver and is bothered significantly by her GI symptoms.  She did have a negative H pylori breath test after treatment.  She has significant constipation and has pain around umbilical hernia.      Past Medical History:   Past Medical History:   Diagnosis Date   • Anxiety    • GERD (gastroesophageal reflux disease)    • Menorrhagia 2024       Past Surgical History:   Past Surgical History:   Procedure Laterality Date   • BREAST SURGERY     • CHOLECYSTECTOMY     • COLONOSCOPY     • COMBINED HYSTEROSCOPY DIAGNOSTIC / D & C  2024   • LABIAPLASTY  2024   • SALPINGECTOMY Bilateral 2024       Immunizations:   Immunization History   Administered Date(s) Administered   • COVID-19 (MODERNA) 1st,2nd,3rd Dose Monovalent 2021   • Tdap 2021        Medications:     Current Outpatient Medications:   •  clonazePAM (KlonoPIN) 1 MG tablet, Take 1 tablet by mouth At Night As Needed for Anxiety., Disp: 30 tablet, Rfl: 0  •  diazePAM (Valium) 5 MG tablet, Insert 1 tablet PV as high as possible nightly for 14 nights., Disp: 14 tablet, Rfl: 0  •  diphenhydrAMINE (BENADRYL) 25 mg capsule, Take 1 capsule by mouth Every 6 (Six) Hours As Needed for Itching., Disp: , Rfl:   •  estradiol (ESTRACE VAGINAL) 0.1 MG/GM vaginal cream, Insert 1 gm intravaginally 2 times each week for 3 weeks and then weekly thereafter., Disp: 1 each, Rfl: 12  •  levocetirizine (XYZAL) 5 MG tablet, Take 1 tablet by mouth Every Evening., Disp: , Rfl:   •  lubiprostone (AMITIZA) 8 MCG capsule, Take 1 capsule by mouth 2 (Two) Times a  "Day With Meals., Disp: , Rfl:   •  lidocaine (XYLOCAINE) 2 % jelly, Apply  topically to the appropriate area as directed 2 (Two) Times a Day. (Patient not taking: Reported on 6/23/2025), Disp: 28.33 g, Rfl: 0  •  linaclotide (Linzess) 290 MCG capsule capsule, Take 1 capsule by mouth Every Morning Before Breakfast., Disp: 8 capsule, Rfl: 0  •  naproxen (NAPROSYN) 500 MG tablet, Take 1 tablet by mouth 2 (Two) Times a Day As Needed for Mild Pain. (Patient not taking: Reported on 6/23/2025), Disp: 20 tablet, Rfl: 0  •  silver sulfadiazine (SILVADENE, SSD) 1 % cream, Apply 1 Application topically to the appropriate area as directed 2 (Two) Times a Day. (Patient not taking: Reported on 6/23/2025), Disp: 25 g, Rfl: 0    Allergies:   Allergies   Allergen Reactions   • Sertraline Unknown - High Severity   • Tetracycline Other (See Comments)     Blurry vision        Family History:   Family History   Problem Relation Age of Onset   • Alcohol abuse Mother    • Drug abuse Mother    • Alcohol abuse Father        Social History:   Social History     Socioeconomic History   • Marital status: Single   • Number of children: 2   Tobacco Use   • Smoking status: Never   • Smokeless tobacco: Never   Vaping Use   • Vaping status: Never Used   Substance and Sexual Activity   • Alcohol use: Yes     Comment: rare   • Drug use: Never   • Sexual activity: Not Currently     Birth control/protection: Tubal ligation         Objective     Vital Signs  /64 (BP Location: Left arm, Patient Position: Sitting, Cuff Size: Adult)   Pulse 62   Ht 167.6 cm (65.98\")   Wt 62.7 kg (138 lb 4.8 oz)   SpO2 98%   BMI 22.33 kg/m²   Estimated body mass index is 22.33 kg/m² as calculated from the following:    Height as of this encounter: 167.6 cm (65.98\").    Weight as of this encounter: 62.7 kg (138 lb 4.8 oz).    BMI is within normal parameters. No other follow-up for BMI required.      Physical Exam  Constitutional:       General: She is not in acute " distress.     Appearance: She is not toxic-appearing.   Cardiovascular:      Rate and Rhythm: Normal rate and regular rhythm.      Heart sounds: No murmur heard.     No friction rub. No gallop.   Pulmonary:      Effort: Pulmonary effort is normal.      Breath sounds: Normal breath sounds.   Abdominal:      General: Abdomen is flat. There is distension.      Tenderness: There is abdominal tenderness (periumbilical and epigastric).      Hernia: A hernia is present.   Skin:     General: Skin is warm and dry.   Neurological:      Mental Status: She is alert.   Psychiatric:         Mood and Affect: Mood normal.         Behavior: Behavior normal.          Assessment and Plan     1. Insomnia, unspecified type  Chronic stable  Continue PRN clonazepam, not taking regularly   - clonazePAM (KlonoPIN) 1 MG tablet; Take 1 tablet by mouth At Night As Needed for Anxiety.  Dispense: 30 tablet; Refill: 0    2. Umbilical hernia without obstruction and without gangrene  Chronic uncontrolled  Having some pain around hernia although it is small. Having constipation  Refer to gen surg  - Ambulatory Referral to General Surgery    3. Chronic constipation  Chronic uncontrolled  Did not notice much benefit on amitiza, sample of linzess given  Consider repeat EGD if not improving with home esomeprazole  - linaclotide (Linzess) 290 MCG capsule capsule; Take 1 capsule by mouth Every Morning Before Breakfast.  Dispense: 8 capsule; Refill: 0       Counseling was given to patient for the following topics: instructions for management.    Follow Up  Return in about 3 months (around 9/23/2025) for Annual physical.    Davdi Brooks MD  [facility]  [address] [place] [id number]  [phone]

## 2025-06-27 ENCOUNTER — HOSPITAL ENCOUNTER (OUTPATIENT)
Dept: PHYSICAL THERAPY | Facility: HOSPITAL | Age: 45
Setting detail: THERAPIES SERIES
Discharge: HOME | End: 2025-06-27
Payer: COMMERCIAL

## 2025-06-27 ENCOUNTER — PATIENT MESSAGE (OUTPATIENT)
Age: 45
End: 2025-06-27
Payer: COMMERCIAL

## 2025-06-27 DIAGNOSIS — K59.09 CHRONIC CONSTIPATION: ICD-10-CM

## 2025-06-27 DIAGNOSIS — M62.89 PELVIC FLOOR DYSFUNCTION: Primary | ICD-10-CM

## 2025-06-27 DIAGNOSIS — R10.2 VULVAR PAIN: ICD-10-CM

## 2025-06-27 DIAGNOSIS — R19.8 PAIN WITH BOWEL MOVEMENTS: ICD-10-CM

## 2025-06-27 DIAGNOSIS — M62.89 PELVIC FLOOR TENSION: ICD-10-CM

## 2025-06-27 PROCEDURE — 97112 NEUROMUSCULAR REEDUCATION: CPT

## 2025-06-27 PROCEDURE — 97110 THERAPEUTIC EXERCISES: CPT

## 2025-06-27 NOTE — THERAPY TREATMENT NOTE
Physical Therapy Daily Note      Patient: Marielos Moore   : 1980  MRN: [id number]   Diagnosis/ICD-10 Code:      ICD-10-CM ICD-9-CM   1. Pelvic floor dysfunction  M62.89 618.83   2. Vulvar pain  R10.2 625.9   3. Pelvic floor tension  M62.89 597.81   4. Pain with bowel movements  R19.8 564.00      Referring practitioner: Diana Hernandez*  Today's Date: 2025    Subjective:   Patient reports: moved from amtiza to linzess, success 1 x then enemas/laxitives, did have success with enemas  Pain: 4/10  Treatment has included: therapeutic exercise and neuromuscular re-education      Subjective      Objective   See Exercise, Manual, and Modality Logs for complete treatment.     25 0900   Total Minutes   18002 - PT Therapeutic Exercise Minutes 22   78543 -  PT Neuromuscular Reeducation Minutes 38   Exercise 1   Exercise Name 1 Internal rectal exam: pressure with insertion, more tension/painfulness in posterior and R puborectalis and coxycc area. perineum not tender when palpated rectally. x5 bowel explusion training on inhale-cues for external sphincter lengthening as she tended to contract after ~3 seconds of bearing down with inhale   Cueing 1 Verbal;Tactile   Time 1 38   Exercise 2   Exercise Name 2 ed of pernieal pressure during bearing down on inhale. Pt to practice lengthening PF on inhale with bearing down in SL, then moving to sitting, then dry-run on the toilet. For improved motor learning and carry over into bowel expulsion   Cueing 2 Verbal;Demo   Time 2 15   Additional Comments HEP   Exercise 3   Exercise Name 3 review of HEP   Cueing 3 Verbal     Verbal consent obtained for internal pelvic exam/treatment with declined need for second person in room    PT Pelvic     Balance Skills Training  04248 -  PT Neuromuscular Reeducation Minutes: 38    Assessment & Plan       Assessment  Assessment details: Pt had good coordination with bearing down on inhale but after ~3-5 seconds external  "sphincter would reflexively contract; she was able to correct this with VC for pelvic floor \"lengthening.\" She will practice this at home in [place] then progress to sitting and then a dry run on toilet along with adding perineal pressure during bearing down. She will complete abdominal massage and cupping 2x daily along with bowel clean out of choice. Patient would continue to benefit from skilled PT services to achieve highest level of functioning.      Plan  Plan details: Patient to continue with PT services to improve PF coordination, PF strength, core strength, decrease pain and improve bowel/bladder function.            Therapy Charges for Today       Code Description Service Date Service Provider Modifiers Qty    13842399750 HC PT NEUROMUSC RE EDUCATION EA 15 MIN 6/27/2025 Juliette Atwood, PT GP 3    68545093903 HC PT THER PROC EA 15 MIN 6/27/2025 Juliette Atwood, PT GP 1          PT SIGNATURE: Juliette Atwood PT, DPT  [place] License # [id number]  Physical Therapist  "

## 2025-07-11 ENCOUNTER — HOSPITAL ENCOUNTER (OUTPATIENT)
Dept: PHYSICAL THERAPY | Facility: HOSPITAL | Age: 45
Setting detail: THERAPIES SERIES
Discharge: HOME | End: 2025-07-11
Payer: COMMERCIAL

## 2025-07-11 DIAGNOSIS — R19.8 PAIN WITH BOWEL MOVEMENTS: ICD-10-CM

## 2025-07-11 DIAGNOSIS — R10.2 VULVAR PAIN: ICD-10-CM

## 2025-07-11 DIAGNOSIS — M62.89 PELVIC FLOOR TENSION: ICD-10-CM

## 2025-07-11 DIAGNOSIS — M62.89 PELVIC FLOOR DYSFUNCTION: Primary | ICD-10-CM

## 2025-07-11 PROCEDURE — 97140 MANUAL THERAPY 1/> REGIONS: CPT

## 2025-07-11 PROCEDURE — 97110 THERAPEUTIC EXERCISES: CPT

## 2025-07-11 NOTE — THERAPY TREATMENT NOTE
Physical Therapy Daily Note      Patient: Marielos Moore   : 1980  MRN: [id number]   Diagnosis/ICD-10 Code:      ICD-10-CM ICD-9-CM   1. Pelvic floor dysfunction  M62.89 618.83   2. Vulvar pain  R10.2 625.9   3. Pelvic floor tension  M62.89 597.81   4. Pain with bowel movements  R19.8 564.00      Referring practitioner: Diana Hernandez*  Today's Date: 2025    Subjective:   [place], she is having 1 BM per week with linzess and fiber combination  Pain: 7/10 perineum  Treatment has included: manual therapy      Subjective      Objective   See Exercise, Manual, and Modality Logs for complete treatment.    Verbal consent obtained for internal pelvic exam/treatment with declined need for second person in room     25 1300   Total Minutes   80980 - PT Manual Therapy Minutes 30   Manual Rx 1   Manual Rx 1 Location pernium scar massage at 3 points in perniuem-theils massage   Manual Rx 1 Grade 1   Manual Rx 2   Manual Rx 2 Location internal muscle release to deep and B superficial, intermediate, and deep layers, each spot requiring 1-3 minutes to release. L side with more tension this date   Manual Rx 2 Grade 1-2   Manual Rx 3   Manual Rx 3 Location silicone cupping to LLQ   Manual Rx 3 Duration no adverse reaction      25 1300   Moist Heat   MH Applied Yes   Location abdomen (8 extra towel layers for barrier over small umbilicle hernia)   PT Moist Heat Minutes 8  (during 90/90 deep breathing. no adverse skin reaction)      25 1400   Total Minutes   52379 - PT Therapeutic Exercise Minutes 8   Exercise 1   Exercise Name 1 deep breathing in 90/90 position   Cueing 1 Verbal     PT Pelvic          Assessment & Plan       Assessment  Assessment details: Session focused on manual therapy at abdomen and PF for relief of symptoms to decreased bowel congestion; no adverse reaction. Pt educated on pelvic wand with aydee, for home use vs dilator to obtain  targeted release of deeper PF muscles. Patient would continue to benefit from skilled PT services to achieve highest level of functioning.      Plan  Plan details: Patient to continue with PT services to improve PF coordination, PF strength, core strength, decrease pain and improve bowel/bladder function.            Therapy Charges for Today       Code Description Service Date Service Provider Modifiers Qty    59443917744  PT THER PROC EA 15 MIN 7/11/2025 Juliette Atwood, PT GP 1    [id number]  PT MANUAL THERAPY EA 15 MIN 7/11/2025 Juliette Atwood, PT GP 2          PT SIGNATURE: Juliette Atwood PT, DPT  [place] License # [id number]  Physical Therapist

## 2025-07-15 ENCOUNTER — APPOINTMENT (OUTPATIENT)
Dept: PHYSICAL THERAPY | Facility: HOSPITAL | Age: 45
End: 2025-07-15
Payer: COMMERCIAL

## 2025-07-22 ENCOUNTER — PATIENT MESSAGE (OUTPATIENT)
Age: 45
End: 2025-07-22
Payer: COMMERCIAL

## 2025-07-22 DIAGNOSIS — R10.9 ABDOMINAL PAIN, UNSPECIFIED ABDOMINAL LOCATION: Primary | ICD-10-CM

## 2025-07-23 ENCOUNTER — HOSPITAL ENCOUNTER (OUTPATIENT)
Dept: PHYSICAL THERAPY | Facility: HOSPITAL | Age: 45
Setting detail: THERAPIES SERIES
Discharge: HOME | End: 2025-07-23
Payer: COMMERCIAL

## 2025-07-23 DIAGNOSIS — R10.2 VULVAR PAIN: ICD-10-CM

## 2025-07-23 DIAGNOSIS — M62.89 PELVIC FLOOR DYSFUNCTION: Primary | ICD-10-CM

## 2025-07-23 DIAGNOSIS — M62.89 PELVIC FLOOR TENSION: ICD-10-CM

## 2025-07-23 DIAGNOSIS — R19.8 PAIN WITH BOWEL MOVEMENTS: ICD-10-CM

## 2025-07-23 PROCEDURE — 97140 MANUAL THERAPY 1/> REGIONS: CPT

## 2025-07-23 PROCEDURE — 97110 THERAPEUTIC EXERCISES: CPT

## 2025-07-23 RX ORDER — DICYCLOMINE HCL 20 MG
20 TABLET ORAL EVERY 6 HOURS PRN
Qty: 30 TABLET | Refills: 1 | Status: SHIPPED | OUTPATIENT
Start: 2025-07-23

## 2025-07-23 RX ORDER — SIMETHICONE 80 MG
80 TABLET,CHEWABLE ORAL EVERY 6 HOURS PRN
Qty: 90 TABLET | Refills: 0 | Status: SHIPPED | OUTPATIENT
Start: 2025-07-23

## 2025-07-23 NOTE — THERAPY TREATMENT NOTE
Physical Therapy Daily Note      Patient: Marielos Moore   : 1980  MRN: [id number]   Diagnosis/ICD-10 Code:      ICD-10-CM ICD-9-CM   1. Pelvic floor dysfunction  M62.89 618.83   2. Vulvar pain  R10.2 625.9   3. Pelvic floor tension  M62.89 597.81   4. Pain with bowel movements  R19.8 564.00      Referring practitioner: Diana Hernandez*  Today's Date: 2025    Subjective:   Patient reports: 1 BM a week with linzess, still straining on toilet, takes a bunch of laxatives to have BM, looking for new PCP  Pain: did not rate  Treatment has included: therapeutic exercise and manual therapy      Subjective      Objective   See Exercise, Manual, and Modality Logs for complete treatment.     25 0900   Total Minutes   71899 - PT Manual Therapy Minutes 30   Manual Rx 2   Manual Rx 2 Location seated PF release, cues for relaxation breathing   Manual Rx 2 Duration no adverse reaction   Manual Rx 3   Manual Rx 3 Location silicone cupping to BLQ   Manual Rx 3 Duration mild errythmea     Verbal consent obtained for external pelvic exam/treatment with declined need for second person in room     25 1000   Total Minutes   25093 - PT Therapeutic Exercise Minutes 8   Exercise 1   Exercise Name 1 Pt returned demo of seated PF muscle release with tennis ball, to trial reaching deep PF/glute muscles at home if unable to release with dialator   Cueing 1 Verbal;Tactile;Demo   Time 1 8     PT Pelvic          Assessment & Plan       Assessment  Assessment details: Good response to cupping for abdominal tension with increased R vs L fascial restrictions. Pt to trial tennis ball release at home for deeper PF/glute muscle vs purchasing pelvic wand for self-muscle tension management. Patient would continue to benefit from skilled PT services to achieve highest level of functioning.      Plan  Plan details: Patient to continue with PT services to improve PF coordination, PF strength, core strength, decrease pain  and improve bowel/bladder function.            Therapy Charges for Today       Code Description Service Date Service Provider Modifiers Qty    96186132806 HC PT THER PROC EA 15 MIN 7/23/2025 Juliette Atwood, PT GP 1    [id number]  PT MANUAL THERAPY EA 15 MIN 7/23/2025 Juliette Atwood, PT GP 2          PT SIGNATURE: Juliette Atwood PT, DPT  [place] License # [id number]  Physical Therapist

## 2025-08-05 ENCOUNTER — HOSPITAL ENCOUNTER (OUTPATIENT)
Dept: CT IMAGING | Facility: HOSPITAL | Age: 45
Discharge: HOME | End: 2025-08-05
Admitting: STUDENT IN AN ORGANIZED HEALTH CARE EDUCATION/TRAINING PROGRAM
Payer: COMMERCIAL

## 2025-08-05 DIAGNOSIS — R10.9 ABDOMINAL PAIN, UNSPECIFIED ABDOMINAL LOCATION: ICD-10-CM

## 2025-08-05 PROCEDURE — 74176 CT ABD & PELVIS W/O CONTRAST: CPT

## 2025-08-12 ENCOUNTER — HOSPITAL ENCOUNTER (OUTPATIENT)
Facility: HOSPITAL | Age: 45
Setting detail: THERAPIES SERIES
Discharge: HOME | End: 2025-08-12
Payer: COMMERCIAL

## 2025-08-12 DIAGNOSIS — M62.89 PELVIC FLOOR TENSION: ICD-10-CM

## 2025-08-12 DIAGNOSIS — R10.2 VULVAR PAIN: ICD-10-CM

## 2025-08-12 DIAGNOSIS — M62.89 PELVIC FLOOR DYSFUNCTION: Primary | ICD-10-CM

## 2025-08-12 DIAGNOSIS — R19.8 PAIN WITH BOWEL MOVEMENTS: ICD-10-CM

## 2025-08-12 PROCEDURE — 97530 THERAPEUTIC ACTIVITIES: CPT

## 2025-08-12 PROCEDURE — 97140 MANUAL THERAPY 1/> REGIONS: CPT

## 2025-08-13 ENCOUNTER — OFFICE VISIT (OUTPATIENT)
Dept: GASTROENTEROLOGY | Facility: CLINIC | Age: 45
End: 2025-08-13
Payer: COMMERCIAL

## 2025-08-13 VITALS
WEIGHT: 140.2 LBS | HEART RATE: 65 BPM | OXYGEN SATURATION: 98 % | HEIGHT: 66 IN | BODY MASS INDEX: 22.53 KG/M2 | DIASTOLIC BLOOD PRESSURE: 70 MMHG | SYSTOLIC BLOOD PRESSURE: 104 MMHG | TEMPERATURE: 98.4 F

## 2025-08-13 DIAGNOSIS — M62.89 PELVIC FLOOR DYSFUNCTION: ICD-10-CM

## 2025-08-13 DIAGNOSIS — R14.0 BLOATING: Primary | ICD-10-CM

## 2025-08-13 DIAGNOSIS — K59.02 CONSTIPATION DUE TO PELVIC FLOOR OUTLET OBSTRUCTION: ICD-10-CM

## 2025-08-13 DIAGNOSIS — K58.1 IRRITABLE BOWEL SYNDROME WITH CONSTIPATION: ICD-10-CM

## 2025-08-13 RX ORDER — ESOMEPRAZOLE MAGNESIUM 40 MG/1
CAPSULE, DELAYED RELEASE ORAL
COMMUNITY
Start: 2025-08-02